# Patient Record
Sex: MALE | Race: WHITE | Employment: OTHER | URBAN - METROPOLITAN AREA
[De-identification: names, ages, dates, MRNs, and addresses within clinical notes are randomized per-mention and may not be internally consistent; named-entity substitution may affect disease eponyms.]

---

## 2018-08-16 ENCOUNTER — OFFICE VISIT (OUTPATIENT)
Dept: SURGERY | Age: 73
End: 2018-08-16
Payer: MEDICARE

## 2018-08-16 VITALS
SYSTOLIC BLOOD PRESSURE: 118 MMHG | HEIGHT: 72 IN | TEMPERATURE: 98.5 F | WEIGHT: 193 LBS | DIASTOLIC BLOOD PRESSURE: 74 MMHG | BODY MASS INDEX: 26.14 KG/M2

## 2018-08-16 DIAGNOSIS — R19.01 ABDOMINAL MASS, RUQ (RIGHT UPPER QUADRANT): Primary | ICD-10-CM

## 2018-08-16 PROCEDURE — 99202 OFFICE O/P NEW SF 15 MIN: CPT | Performed by: SURGERY

## 2018-08-16 RX ORDER — PRAVASTATIN SODIUM 20 MG
10 TABLET ORAL DAILY
COMMUNITY
Start: 2018-06-11

## 2018-08-16 RX ORDER — LISINOPRIL 5 MG/1
TABLET ORAL
Status: ON HOLD | COMMUNITY
Start: 2018-06-11 | End: 2021-09-19

## 2018-08-16 ASSESSMENT — ENCOUNTER SYMPTOMS
RHINORRHEA: 0
ABDOMINAL PAIN: 1
EYES NEGATIVE: 1
ALLERGIC/IMMUNOLOGIC NEGATIVE: 1
CONSTIPATION: 0
CHEST TIGHTNESS: 0
SHORTNESS OF BREATH: 0
BLOOD IN STOOL: 0
COLOR CHANGE: 0
ABDOMINAL DISTENTION: 0

## 2018-08-22 ENCOUNTER — HOSPITAL ENCOUNTER (OUTPATIENT)
Dept: CT IMAGING | Age: 73
Discharge: HOME | End: 2018-08-24
Payer: MEDICARE

## 2018-08-22 VITALS
RESPIRATION RATE: 16 BRPM | HEART RATE: 75 BPM | HEIGHT: 72 IN | BODY MASS INDEX: 26.41 KG/M2 | DIASTOLIC BLOOD PRESSURE: 76 MMHG | SYSTOLIC BLOOD PRESSURE: 145 MMHG | WEIGHT: 195 LBS

## 2018-08-22 DIAGNOSIS — R19.01 ABDOMINAL MASS, RUQ (RIGHT UPPER QUADRANT): ICD-10-CM

## 2018-08-22 PROCEDURE — 74177 CT ABD & PELVIS W/CONTRAST: CPT

## 2018-08-22 PROCEDURE — 2500000003 HC RX 250 WO HCPCS: Performed by: SURGERY

## 2018-08-22 PROCEDURE — 6360000004 HC RX CONTRAST MEDICATION: Performed by: SURGERY

## 2018-08-22 PROCEDURE — 2580000003 HC RX 258: Performed by: SURGERY

## 2018-08-22 RX ORDER — SODIUM CHLORIDE 0.9 % (FLUSH) 0.9 %
10 SYRINGE (ML) INJECTION ONCE
Status: COMPLETED | OUTPATIENT
Start: 2018-08-22 | End: 2018-08-22

## 2018-08-22 RX ADMIN — BARIUM SULFATE 450 ML: 20 SUSPENSION ORAL at 15:16

## 2018-08-22 RX ADMIN — Medication 10 ML: at 15:16

## 2018-08-22 RX ADMIN — IOPAMIDOL 100 ML: 755 INJECTION, SOLUTION INTRAVENOUS at 15:15

## 2018-08-23 ENCOUNTER — TELEPHONE (OUTPATIENT)
Dept: SURGERY | Age: 73
End: 2018-08-23

## 2018-09-06 ENCOUNTER — PREP FOR PROCEDURE (OUTPATIENT)
Dept: SURGERY | Age: 73
End: 2018-09-06

## 2018-09-06 ENCOUNTER — OFFICE VISIT (OUTPATIENT)
Dept: SURGERY | Age: 73
End: 2018-09-06
Payer: MEDICARE

## 2018-09-06 VITALS
WEIGHT: 193 LBS | BODY MASS INDEX: 26.14 KG/M2 | SYSTOLIC BLOOD PRESSURE: 122 MMHG | TEMPERATURE: 97.3 F | DIASTOLIC BLOOD PRESSURE: 76 MMHG | HEIGHT: 72 IN

## 2018-09-06 DIAGNOSIS — K43.6 INCARCERATED VENTRAL HERNIA: Primary | ICD-10-CM

## 2018-09-06 PROCEDURE — 1123F ACP DISCUSS/DSCN MKR DOCD: CPT | Performed by: SURGERY

## 2018-09-06 PROCEDURE — 3017F COLORECTAL CA SCREEN DOC REV: CPT | Performed by: SURGERY

## 2018-09-06 PROCEDURE — G8427 DOCREV CUR MEDS BY ELIG CLIN: HCPCS | Performed by: SURGERY

## 2018-09-06 PROCEDURE — 99213 OFFICE O/P EST LOW 20 MIN: CPT | Performed by: SURGERY

## 2018-09-06 PROCEDURE — G8419 CALC BMI OUT NRM PARAM NOF/U: HCPCS | Performed by: SURGERY

## 2018-09-06 PROCEDURE — 1101F PT FALLS ASSESS-DOCD LE1/YR: CPT | Performed by: SURGERY

## 2018-09-06 PROCEDURE — 1036F TOBACCO NON-USER: CPT | Performed by: SURGERY

## 2018-09-06 PROCEDURE — 4040F PNEUMOC VAC/ADMIN/RCVD: CPT | Performed by: SURGERY

## 2018-09-06 ASSESSMENT — ENCOUNTER SYMPTOMS
BLOOD IN STOOL: 0
SHORTNESS OF BREATH: 0
ABDOMINAL DISTENTION: 0
CHEST TIGHTNESS: 0
CONSTIPATION: 0
RHINORRHEA: 0
ALLERGIC/IMMUNOLOGIC NEGATIVE: 1
ABDOMINAL PAIN: 1
EYES NEGATIVE: 1
COLOR CHANGE: 0

## 2018-09-24 ENCOUNTER — HOSPITAL ENCOUNTER (OUTPATIENT)
Dept: PREADMISSION TESTING | Age: 73
Discharge: HOME | End: 2018-09-28
Payer: MEDICARE

## 2018-09-24 VITALS
SYSTOLIC BLOOD PRESSURE: 154 MMHG | DIASTOLIC BLOOD PRESSURE: 82 MMHG | RESPIRATION RATE: 16 BRPM | BODY MASS INDEX: 27.19 KG/M2 | TEMPERATURE: 97.5 F | HEART RATE: 78 BPM | HEIGHT: 71 IN | WEIGHT: 194.25 LBS | OXYGEN SATURATION: 98 %

## 2018-09-24 PROBLEM — R94.31 ABNORMAL EKG: Status: ACTIVE | Noted: 2018-09-24

## 2018-09-24 PROBLEM — M17.9 OSTEOARTHROSIS OF KNEE: Status: ACTIVE | Noted: 2018-09-24

## 2018-09-24 PROBLEM — E55.9 VITAMIN D DEFICIENCY: Status: ACTIVE | Noted: 2018-09-24

## 2018-09-24 PROBLEM — K76.0 FATTY LIVER DISEASE, NONALCOHOLIC: Status: ACTIVE | Noted: 2018-09-24

## 2018-09-24 PROBLEM — N40.0 BPH WITHOUT URINARY OBSTRUCTION: Status: ACTIVE | Noted: 2018-09-24

## 2018-09-24 PROBLEM — M48.061 SPINAL STENOSIS OF LUMBAR REGION: Status: ACTIVE | Noted: 2018-09-24

## 2018-09-24 PROBLEM — R79.89 ABNORMAL LFTS (LIVER FUNCTION TESTS): Status: ACTIVE | Noted: 2018-09-24

## 2018-09-24 PROBLEM — I10 HYPERTENSION: Status: ACTIVE | Noted: 2018-09-24

## 2018-09-24 LAB
ANION GAP SERPL CALCULATED.3IONS-SCNC: 11 MEQ/L (ref 7–13)
BUN BLDV-MCNC: 12 MG/DL (ref 8–23)
CALCIUM SERPL-MCNC: 9.1 MG/DL (ref 8.6–10.2)
CHLORIDE BLD-SCNC: 102 MEQ/L (ref 98–107)
CO2: 27 MEQ/L (ref 22–29)
CREAT SERPL-MCNC: 0.6 MG/DL (ref 0.7–1.2)
EKG ATRIAL RATE: 67 BPM
EKG P AXIS: 65 DEGREES
EKG P-R INTERVAL: 184 MS
EKG Q-T INTERVAL: 430 MS
EKG QRS DURATION: 152 MS
EKG QTC CALCULATION (BAZETT): 454 MS
EKG R AXIS: -64 DEGREES
EKG T AXIS: 96 DEGREES
EKG VENTRICULAR RATE: 67 BPM
GFR AFRICAN AMERICAN: >60
GFR NON-AFRICAN AMERICAN: >60
GLUCOSE BLD-MCNC: 100 MG/DL (ref 74–109)
HCT VFR BLD CALC: 42.8 % (ref 42–52)
HEMOGLOBIN: 14.7 G/DL (ref 14–18)
MCH RBC QN AUTO: 33.3 PG (ref 27–31.3)
MCHC RBC AUTO-ENTMCNC: 34.3 % (ref 33–37)
MCV RBC AUTO: 97 FL (ref 80–100)
PDW BLD-RTO: 13.1 % (ref 11.5–14.5)
PLATELET # BLD: 210 K/UL (ref 130–400)
POTASSIUM SERPL-SCNC: 4.2 MEQ/L (ref 3.5–5.1)
RBC # BLD: 4.42 M/UL (ref 4.7–6.1)
SODIUM BLD-SCNC: 140 MEQ/L (ref 132–144)
WBC # BLD: 5.8 K/UL (ref 4.8–10.8)

## 2018-09-24 PROCEDURE — 93005 ELECTROCARDIOGRAM TRACING: CPT

## 2018-09-24 PROCEDURE — 80048 BASIC METABOLIC PNL TOTAL CA: CPT

## 2018-09-24 PROCEDURE — 85027 COMPLETE CBC AUTOMATED: CPT

## 2018-09-24 RX ORDER — SODIUM CHLORIDE 0.9 % (FLUSH) 0.9 %
10 SYRINGE (ML) INJECTION PRN
Status: CANCELLED | OUTPATIENT
Start: 2018-09-24

## 2018-09-24 RX ORDER — SODIUM CHLORIDE, SODIUM LACTATE, POTASSIUM CHLORIDE, CALCIUM CHLORIDE 600; 310; 30; 20 MG/100ML; MG/100ML; MG/100ML; MG/100ML
INJECTION, SOLUTION INTRAVENOUS CONTINUOUS
Status: CANCELLED | OUTPATIENT
Start: 2018-09-24

## 2018-09-24 RX ORDER — LIDOCAINE HYDROCHLORIDE 10 MG/ML
1 INJECTION, SOLUTION EPIDURAL; INFILTRATION; INTRACAUDAL; PERINEURAL
Status: CANCELLED | OUTPATIENT
Start: 2018-09-24 | End: 2018-09-24

## 2018-09-24 RX ORDER — M-VIT,TX,IRON,MINS/CALC/FOLIC 27MG-0.4MG
1 TABLET ORAL DAILY
COMMUNITY

## 2018-09-24 RX ORDER — KETOROLAC TROMETHAMINE 30 MG/ML
30 INJECTION, SOLUTION INTRAMUSCULAR; INTRAVENOUS ONCE
Status: CANCELLED | OUTPATIENT
Start: 2018-09-28 | End: 2018-10-02

## 2018-09-24 RX ORDER — SODIUM CHLORIDE 0.9 % (FLUSH) 0.9 %
10 SYRINGE (ML) INJECTION EVERY 12 HOURS SCHEDULED
Status: CANCELLED | OUTPATIENT
Start: 2018-09-24

## 2018-09-25 PROCEDURE — 93010 ELECTROCARDIOGRAM REPORT: CPT | Performed by: INTERNAL MEDICINE

## 2018-09-28 ENCOUNTER — HOSPITAL ENCOUNTER (OUTPATIENT)
Age: 73
Setting detail: OUTPATIENT SURGERY
Discharge: HOME | End: 2018-09-28
Attending: SURGERY | Admitting: SURGERY
Payer: MEDICARE

## 2018-09-28 ENCOUNTER — ANESTHESIA EVENT (OUTPATIENT)
Dept: OPERATING ROOM | Age: 73
End: 2018-09-28
Payer: MEDICARE

## 2018-09-28 ENCOUNTER — ANESTHESIA (OUTPATIENT)
Dept: OPERATING ROOM | Age: 73
End: 2018-09-28
Payer: MEDICARE

## 2018-09-28 VITALS
DIASTOLIC BLOOD PRESSURE: 66 MMHG | RESPIRATION RATE: 16 BRPM | TEMPERATURE: 97.5 F | HEIGHT: 71 IN | SYSTOLIC BLOOD PRESSURE: 139 MMHG | BODY MASS INDEX: 27.16 KG/M2 | HEART RATE: 70 BPM | OXYGEN SATURATION: 96 % | WEIGHT: 194 LBS

## 2018-09-28 VITALS — TEMPERATURE: 95.9 F | DIASTOLIC BLOOD PRESSURE: 70 MMHG | SYSTOLIC BLOOD PRESSURE: 122 MMHG | OXYGEN SATURATION: 100 %

## 2018-09-28 DIAGNOSIS — K43.6 INCARCERATED VENTRAL HERNIA: Primary | ICD-10-CM

## 2018-09-28 PROCEDURE — 2580000003 HC RX 258: Performed by: NURSE PRACTITIONER

## 2018-09-28 PROCEDURE — 2500000003 HC RX 250 WO HCPCS: Performed by: NURSE ANESTHETIST, CERTIFIED REGISTERED

## 2018-09-28 PROCEDURE — 6370000000 HC RX 637 (ALT 250 FOR IP): Performed by: SURGERY

## 2018-09-28 PROCEDURE — 7100000001 HC PACU RECOVERY - ADDTL 15 MIN: Performed by: SURGERY

## 2018-09-28 PROCEDURE — 3600000002 HC SURGERY LEVEL 2 BASE: Performed by: SURGERY

## 2018-09-28 PROCEDURE — 2709999900 HC NON-CHARGEABLE SUPPLY: Performed by: SURGERY

## 2018-09-28 PROCEDURE — A4648 IMPLANTABLE TISSUE MARKER: HCPCS | Performed by: SURGERY

## 2018-09-28 PROCEDURE — 6360000002 HC RX W HCPCS: Performed by: NURSE PRACTITIONER

## 2018-09-28 PROCEDURE — 2500000003 HC RX 250 WO HCPCS

## 2018-09-28 PROCEDURE — 3700000000 HC ANESTHESIA ATTENDED CARE: Performed by: SURGERY

## 2018-09-28 PROCEDURE — 7100000011 HC PHASE II RECOVERY - ADDTL 15 MIN: Performed by: SURGERY

## 2018-09-28 PROCEDURE — 6360000002 HC RX W HCPCS: Performed by: SURGERY

## 2018-09-28 PROCEDURE — 49568 PR IMPLANT MESH HERNIA REPAIR/DEBRIDEMENT CLOSURE: CPT | Performed by: SURGERY

## 2018-09-28 PROCEDURE — C1781 MESH (IMPLANTABLE): HCPCS | Performed by: SURGERY

## 2018-09-28 PROCEDURE — 49560 PR REPAIR INCISIONAL HERNIA,REDUCIBLE: CPT | Performed by: SURGERY

## 2018-09-28 PROCEDURE — 7100000000 HC PACU RECOVERY - FIRST 15 MIN: Performed by: SURGERY

## 2018-09-28 PROCEDURE — 3700000001 HC ADD 15 MINUTES (ANESTHESIA): Performed by: SURGERY

## 2018-09-28 PROCEDURE — 3600000012 HC SURGERY LEVEL 2 ADDTL 15MIN: Performed by: SURGERY

## 2018-09-28 PROCEDURE — 7100000010 HC PHASE II RECOVERY - FIRST 15 MIN: Performed by: SURGERY

## 2018-09-28 PROCEDURE — L0625 LO FLEX L1-BELOW L5 PRE OTS: HCPCS | Performed by: SURGERY

## 2018-09-28 PROCEDURE — 6360000002 HC RX W HCPCS: Performed by: NURSE ANESTHETIST, CERTIFIED REGISTERED

## 2018-09-28 PROCEDURE — 2580000003 HC RX 258: Performed by: SURGERY

## 2018-09-28 DEVICE — PATCH HERN L DIA3.2IN CIR W/ STRP SEPRA TECHNOLOGY ABSRB: Type: IMPLANTABLE DEVICE | Site: ABDOMEN | Status: FUNCTIONAL

## 2018-09-28 RX ORDER — HYDROCODONE BITARTRATE AND ACETAMINOPHEN 5; 325 MG/1; MG/1
1 TABLET ORAL PRN
Status: DISCONTINUED | OUTPATIENT
Start: 2018-09-28 | End: 2018-09-28 | Stop reason: HOSPADM

## 2018-09-28 RX ORDER — SODIUM CHLORIDE, SODIUM LACTATE, POTASSIUM CHLORIDE, CALCIUM CHLORIDE 600; 310; 30; 20 MG/100ML; MG/100ML; MG/100ML; MG/100ML
INJECTION, SOLUTION INTRAVENOUS CONTINUOUS
Status: DISCONTINUED | OUTPATIENT
Start: 2018-09-28 | End: 2018-09-28 | Stop reason: HOSPADM

## 2018-09-28 RX ORDER — PROPOFOL 10 MG/ML
INJECTION, EMULSION INTRAVENOUS PRN
Status: DISCONTINUED | OUTPATIENT
Start: 2018-09-28 | End: 2018-09-28 | Stop reason: SDUPTHER

## 2018-09-28 RX ORDER — ONDANSETRON 2 MG/ML
INJECTION INTRAMUSCULAR; INTRAVENOUS PRN
Status: DISCONTINUED | OUTPATIENT
Start: 2018-09-28 | End: 2018-09-28 | Stop reason: SDUPTHER

## 2018-09-28 RX ORDER — LIDOCAINE HYDROCHLORIDE 10 MG/ML
1 INJECTION, SOLUTION EPIDURAL; INFILTRATION; INTRACAUDAL; PERINEURAL
Status: DISCONTINUED | OUTPATIENT
Start: 2018-09-28 | End: 2018-09-28 | Stop reason: HOSPADM

## 2018-09-28 RX ORDER — MAGNESIUM HYDROXIDE 1200 MG/15ML
LIQUID ORAL CONTINUOUS PRN
Status: DISCONTINUED | OUTPATIENT
Start: 2018-09-28 | End: 2018-09-28 | Stop reason: HOSPADM

## 2018-09-28 RX ORDER — ONDANSETRON 2 MG/ML
4 INJECTION INTRAMUSCULAR; INTRAVENOUS
Status: DISCONTINUED | OUTPATIENT
Start: 2018-09-28 | End: 2018-09-28 | Stop reason: HOSPADM

## 2018-09-28 RX ORDER — SODIUM CHLORIDE 0.9 % (FLUSH) 0.9 %
10 SYRINGE (ML) INJECTION EVERY 12 HOURS SCHEDULED
Status: DISCONTINUED | OUTPATIENT
Start: 2018-09-28 | End: 2018-09-28 | Stop reason: HOSPADM

## 2018-09-28 RX ORDER — HYDROCODONE BITARTRATE AND ACETAMINOPHEN 5; 325 MG/1; MG/1
1 TABLET ORAL EVERY 6 HOURS PRN
Qty: 25 TABLET | Refills: 0 | Status: SHIPPED | OUTPATIENT
Start: 2018-09-28 | End: 2018-10-05

## 2018-09-28 RX ORDER — SODIUM CHLORIDE 0.9 % (FLUSH) 0.9 %
10 SYRINGE (ML) INJECTION PRN
Status: DISCONTINUED | OUTPATIENT
Start: 2018-09-28 | End: 2018-09-28 | Stop reason: HOSPADM

## 2018-09-28 RX ORDER — SODIUM CHLORIDE, SODIUM LACTATE, POTASSIUM CHLORIDE, CALCIUM CHLORIDE 600; 310; 30; 20 MG/100ML; MG/100ML; MG/100ML; MG/100ML
INJECTION, SOLUTION INTRAVENOUS
Status: DISCONTINUED
Start: 2018-09-28 | End: 2018-09-28 | Stop reason: HOSPADM

## 2018-09-28 RX ORDER — LIDOCAINE HYDROCHLORIDE 10 MG/ML
INJECTION, SOLUTION EPIDURAL; INFILTRATION; INTRACAUDAL; PERINEURAL
Status: COMPLETED
Start: 2018-09-28 | End: 2018-09-28

## 2018-09-28 RX ORDER — MORPHINE SULFATE 2 MG/ML
1 INJECTION, SOLUTION INTRAMUSCULAR; INTRAVENOUS
Status: DISCONTINUED | OUTPATIENT
Start: 2018-09-28 | End: 2018-09-28 | Stop reason: HOSPADM

## 2018-09-28 RX ORDER — ESMOLOL HYDROCHLORIDE 10 MG/ML
INJECTION INTRAVENOUS PRN
Status: DISCONTINUED | OUTPATIENT
Start: 2018-09-28 | End: 2018-09-28 | Stop reason: SDUPTHER

## 2018-09-28 RX ORDER — MIDAZOLAM HYDROCHLORIDE 1 MG/ML
INJECTION INTRAMUSCULAR; INTRAVENOUS PRN
Status: DISCONTINUED | OUTPATIENT
Start: 2018-09-28 | End: 2018-09-28 | Stop reason: SDUPTHER

## 2018-09-28 RX ORDER — HYDROCODONE BITARTRATE AND ACETAMINOPHEN 5; 325 MG/1; MG/1
2 TABLET ORAL PRN
Status: DISCONTINUED | OUTPATIENT
Start: 2018-09-28 | End: 2018-09-28 | Stop reason: HOSPADM

## 2018-09-28 RX ORDER — KETOROLAC TROMETHAMINE 15 MG/ML
15 INJECTION, SOLUTION INTRAMUSCULAR; INTRAVENOUS ONCE
Status: COMPLETED | OUTPATIENT
Start: 2018-09-28 | End: 2018-09-28

## 2018-09-28 RX ORDER — DEXAMETHASONE SODIUM PHOSPHATE 10 MG/ML
INJECTION INTRAMUSCULAR; INTRAVENOUS PRN
Status: DISCONTINUED | OUTPATIENT
Start: 2018-09-28 | End: 2018-09-28 | Stop reason: SDUPTHER

## 2018-09-28 RX ORDER — LIDOCAINE HYDROCHLORIDE 20 MG/ML
INJECTION, SOLUTION EPIDURAL; INFILTRATION; INTRACAUDAL; PERINEURAL PRN
Status: DISCONTINUED | OUTPATIENT
Start: 2018-09-28 | End: 2018-09-28 | Stop reason: SDUPTHER

## 2018-09-28 RX ORDER — HYDROCODONE BITARTRATE AND ACETAMINOPHEN 5; 325 MG/1; MG/1
2 TABLET ORAL EVERY 4 HOURS PRN
Status: DISCONTINUED | OUTPATIENT
Start: 2018-09-28 | End: 2018-09-28 | Stop reason: HOSPADM

## 2018-09-28 RX ORDER — ACETAMINOPHEN 325 MG/1
650 TABLET ORAL EVERY 4 HOURS PRN
Status: DISCONTINUED | OUTPATIENT
Start: 2018-09-28 | End: 2018-09-28 | Stop reason: HOSPADM

## 2018-09-28 RX ORDER — ROCURONIUM BROMIDE 10 MG/ML
INJECTION, SOLUTION INTRAVENOUS PRN
Status: DISCONTINUED | OUTPATIENT
Start: 2018-09-28 | End: 2018-09-28 | Stop reason: SDUPTHER

## 2018-09-28 RX ORDER — FENTANYL CITRATE 50 UG/ML
INJECTION, SOLUTION INTRAMUSCULAR; INTRAVENOUS PRN
Status: DISCONTINUED | OUTPATIENT
Start: 2018-09-28 | End: 2018-09-28 | Stop reason: SDUPTHER

## 2018-09-28 RX ORDER — LIDOCAINE HYDROCHLORIDE 10 MG/ML
1 INJECTION, SOLUTION EPIDURAL; INFILTRATION; INTRACAUDAL; PERINEURAL
Status: COMPLETED | OUTPATIENT
Start: 2018-09-28 | End: 2018-09-28

## 2018-09-28 RX ORDER — MEPERIDINE HYDROCHLORIDE 25 MG/ML
12.5 INJECTION INTRAMUSCULAR; INTRAVENOUS; SUBCUTANEOUS EVERY 5 MIN PRN
Status: DISCONTINUED | OUTPATIENT
Start: 2018-09-28 | End: 2018-09-28 | Stop reason: HOSPADM

## 2018-09-28 RX ORDER — HYDROCODONE BITARTRATE AND ACETAMINOPHEN 5; 325 MG/1; MG/1
1 TABLET ORAL EVERY 4 HOURS PRN
Status: DISCONTINUED | OUTPATIENT
Start: 2018-09-28 | End: 2018-09-28 | Stop reason: HOSPADM

## 2018-09-28 RX ORDER — FENTANYL CITRATE 50 UG/ML
50 INJECTION, SOLUTION INTRAMUSCULAR; INTRAVENOUS EVERY 10 MIN PRN
Status: DISCONTINUED | OUTPATIENT
Start: 2018-09-28 | End: 2018-09-28 | Stop reason: HOSPADM

## 2018-09-28 RX ORDER — METOCLOPRAMIDE HYDROCHLORIDE 5 MG/ML
10 INJECTION INTRAMUSCULAR; INTRAVENOUS
Status: DISCONTINUED | OUTPATIENT
Start: 2018-09-28 | End: 2018-09-28 | Stop reason: HOSPADM

## 2018-09-28 RX ORDER — DIPHENHYDRAMINE HYDROCHLORIDE 50 MG/ML
12.5 INJECTION INTRAMUSCULAR; INTRAVENOUS
Status: DISCONTINUED | OUTPATIENT
Start: 2018-09-28 | End: 2018-09-28 | Stop reason: HOSPADM

## 2018-09-28 RX ADMIN — LIDOCAINE HYDROCHLORIDE 0.1 ML: 10 INJECTION, SOLUTION EPIDURAL; INFILTRATION; INTRACAUDAL; PERINEURAL at 06:59

## 2018-09-28 RX ADMIN — SODIUM CHLORIDE, POTASSIUM CHLORIDE, SODIUM LACTATE AND CALCIUM CHLORIDE: 600; 310; 30; 20 INJECTION, SOLUTION INTRAVENOUS at 07:00

## 2018-09-28 RX ADMIN — SODIUM CHLORIDE, POTASSIUM CHLORIDE, SODIUM LACTATE AND CALCIUM CHLORIDE: 600; 310; 30; 20 INJECTION, SOLUTION INTRAVENOUS at 07:55

## 2018-09-28 RX ADMIN — ROCURONIUM BROMIDE 50 MG: 10 INJECTION INTRAVENOUS at 07:32

## 2018-09-28 RX ADMIN — PROPOFOL 50 MG: 10 INJECTION, EMULSION INTRAVENOUS at 07:52

## 2018-09-28 RX ADMIN — LIDOCAINE HYDROCHLORIDE 75 MG: 20 INJECTION, SOLUTION EPIDURAL; INFILTRATION; INTRACAUDAL; PERINEURAL at 07:32

## 2018-09-28 RX ADMIN — PROPOFOL 150 MG: 10 INJECTION, EMULSION INTRAVENOUS at 07:32

## 2018-09-28 RX ADMIN — HYDROCODONE BITARTRATE AND ACETAMINOPHEN 1 TABLET: 5; 325 TABLET ORAL at 10:27

## 2018-09-28 RX ADMIN — Medication 2 G: at 07:28

## 2018-09-28 RX ADMIN — DEXAMETHASONE SODIUM PHOSPHATE 10 MG: 10 INJECTION INTRAMUSCULAR; INTRAVENOUS at 07:38

## 2018-09-28 RX ADMIN — PROPOFOL 50 MG: 10 INJECTION, EMULSION INTRAVENOUS at 08:00

## 2018-09-28 RX ADMIN — MIDAZOLAM HYDROCHLORIDE 2 MG: 1 INJECTION, SOLUTION INTRAMUSCULAR; INTRAVENOUS at 07:26

## 2018-09-28 RX ADMIN — HYDROMORPHONE HYDROCHLORIDE 0.5 MG: 1 INJECTION, SOLUTION INTRAMUSCULAR; INTRAVENOUS; SUBCUTANEOUS at 08:50

## 2018-09-28 RX ADMIN — PHENYLEPHRINE HYDROCHLORIDE 100 MCG: 10 INJECTION INTRAVENOUS at 07:35

## 2018-09-28 RX ADMIN — ONDANSETRON HYDROCHLORIDE 4 MG: 2 INJECTION, SOLUTION INTRAMUSCULAR; INTRAVENOUS at 07:38

## 2018-09-28 RX ADMIN — PROPOFOL 50 MG: 10 INJECTION, EMULSION INTRAVENOUS at 07:45

## 2018-09-28 RX ADMIN — PHENYLEPHRINE HYDROCHLORIDE 100 MCG: 10 INJECTION INTRAVENOUS at 08:14

## 2018-09-28 RX ADMIN — FENTANYL CITRATE 50 MCG: 50 INJECTION, SOLUTION INTRAMUSCULAR; INTRAVENOUS at 08:00

## 2018-09-28 RX ADMIN — SODIUM CHLORIDE, POTASSIUM CHLORIDE, SODIUM LACTATE AND CALCIUM CHLORIDE: 600; 310; 30; 20 INJECTION, SOLUTION INTRAVENOUS at 07:01

## 2018-09-28 RX ADMIN — ROCURONIUM BROMIDE 10 MG: 10 INJECTION INTRAVENOUS at 08:00

## 2018-09-28 RX ADMIN — ESMOLOL HYDROCHLORIDE 20 MG: 100 INJECTION, SOLUTION INTRAVENOUS at 08:01

## 2018-09-28 RX ADMIN — FENTANYL CITRATE 100 MCG: 50 INJECTION, SOLUTION INTRAMUSCULAR; INTRAVENOUS at 07:32

## 2018-09-28 RX ADMIN — PHENYLEPHRINE HYDROCHLORIDE 100 MCG: 10 INJECTION INTRAVENOUS at 08:05

## 2018-09-28 RX ADMIN — FENTANYL CITRATE 100 MCG: 50 INJECTION, SOLUTION INTRAMUSCULAR; INTRAVENOUS at 07:53

## 2018-09-28 RX ADMIN — SUGAMMADEX 352 MG: 100 INJECTION, SOLUTION INTRAVENOUS at 08:20

## 2018-09-28 RX ADMIN — KETOROLAC TROMETHAMINE 15 MG: 15 INJECTION, SOLUTION INTRAMUSCULAR; INTRAVENOUS at 07:00

## 2018-09-28 RX ADMIN — HYDROMORPHONE HYDROCHLORIDE 0.5 MG: 1 INJECTION, SOLUTION INTRAMUSCULAR; INTRAVENOUS; SUBCUTANEOUS at 08:40

## 2018-09-28 ASSESSMENT — PULMONARY FUNCTION TESTS
PIF_VALUE: 2
PIF_VALUE: 2
PIF_VALUE: 16
PIF_VALUE: 17
PIF_VALUE: 0
PIF_VALUE: 1
PIF_VALUE: 16
PIF_VALUE: 16
PIF_VALUE: 14
PIF_VALUE: 17
PIF_VALUE: 14
PIF_VALUE: 13
PIF_VALUE: 17
PIF_VALUE: 17
PIF_VALUE: 15
PIF_VALUE: 15
PIF_VALUE: 1
PIF_VALUE: 14
PIF_VALUE: 2
PIF_VALUE: 1
PIF_VALUE: 7
PIF_VALUE: 20
PIF_VALUE: 14
PIF_VALUE: 16
PIF_VALUE: 15
PIF_VALUE: 12
PIF_VALUE: 15
PIF_VALUE: 1
PIF_VALUE: 4
PIF_VALUE: 16
PIF_VALUE: 1
PIF_VALUE: 1
PIF_VALUE: 16
PIF_VALUE: 1
PIF_VALUE: 17
PIF_VALUE: 15
PIF_VALUE: 15
PIF_VALUE: 0
PIF_VALUE: 13
PIF_VALUE: 9
PIF_VALUE: 15
PIF_VALUE: 16
PIF_VALUE: 15
PIF_VALUE: 3
PIF_VALUE: 26
PIF_VALUE: 13
PIF_VALUE: 16
PIF_VALUE: 0
PIF_VALUE: 17
PIF_VALUE: 2
PIF_VALUE: 13
PIF_VALUE: 12
PIF_VALUE: 15
PIF_VALUE: 15
PIF_VALUE: 1
PIF_VALUE: 1
PIF_VALUE: 16
PIF_VALUE: 16
PIF_VALUE: 1
PIF_VALUE: 12
PIF_VALUE: 16
PIF_VALUE: 3
PIF_VALUE: 15
PIF_VALUE: 21
PIF_VALUE: 16
PIF_VALUE: 15
PIF_VALUE: 17
PIF_VALUE: 0
PIF_VALUE: 40
PIF_VALUE: 2
PIF_VALUE: 17
PIF_VALUE: 15
PIF_VALUE: 2
PIF_VALUE: 15
PIF_VALUE: 12
PIF_VALUE: 4
PIF_VALUE: 9
PIF_VALUE: 2

## 2018-09-28 ASSESSMENT — PAIN SCALES - GENERAL
PAINLEVEL_OUTOF10: 0
PAINLEVEL_OUTOF10: 4
PAINLEVEL_OUTOF10: 0

## 2018-09-28 ASSESSMENT — PAIN - FUNCTIONAL ASSESSMENT: PAIN_FUNCTIONAL_ASSESSMENT: 0-10

## 2018-09-28 NOTE — ANESTHESIA PRE PROCEDURE
Department of Anesthesiology  Preprocedure Note       Name:  Alexis Freeman   Age:  75 y.o.  :  1945                                          MRN:  [id number]         Date:  2018      Surgeon: Esequiel Dobbins):  Aden Aguirre MD    Procedure: Procedure(s):  REPAIR VENTRAL HERNIA, 2 HRS    Medications prior to admission:   Prior to Admission medications    Medication Sig Start Date End Date Taking?  Authorizing Provider   lisinopril (PRINIVIL;ZESTRIL) 5 MG tablet  18  Yes Historical Provider, MD   aspirin 81 MG tablet Take 81 mg by mouth daily    Historical Provider, MD   Multiple Vitamins-Minerals (THERAPEUTIC MULTIVITAMIN-MINERALS) tablet Take 1 tablet by mouth daily    Historical Provider, MD   Cholecalciferol 2000 units CAPS Take 2,000 Units by mouth    Historical Provider, MD   pravastatin (PRAVACHOL) 20 MG tablet Take 20 mg by mouth 18   Historical Provider, MD       Current medications:    Current Facility-Administered Medications   Medication Dose Route Frequency Provider Last Rate Last Dose    ketorolac (TORADOL) injection 15 mg  15 mg Intravenous Once Aden Aguirre MD        lactated ringers infusion   Intravenous Continuous FRANCHESKA Petersen - CNP        lidocaine PF 1 % injection 1 mL  1 mL Intradermal Once PRN FRANCHESKA Petersen - CNP        ceFAZolin (ANCEF) 2 g in dextrose 5 % 100 mL IVPB  2 g Intravenous Once FRANCHESKA Petersen - CNP        lactated ringers infusion             lidocaine PF 1 % injection             fentaNYL (SUBLIMAZE) injection 50 mcg  50 mcg Intravenous Q10 Min PRN Debbie Alfonso MD        HYDROmorphone (DILAUDID) injection 0.5 mg  0.5 mg Intravenous Q10 Min PRN Debbie Alfonso MD        HYDROcodone-acetaminophen [facility]) 5-325 MG per tablet 1 tablet  1 tablet Oral PRN Debbie Alfonso MD        Or    HYDROcodone-acetaminophen [facility]) 5-325 MG per tablet 2 tablet  2 tablet Oral PRN MD Mariposa Jasso diphenhydrAMINE (BENADRYL) injection 12.5 mg  12.5 mg Intravenous Once PRN Vamsi Patton MD        ondansetron [facility]) injection 4 mg  4 mg Intravenous Once PRN Vamsi Patton MD        metoclopramide [facility]) injection 10 mg  10 mg Intravenous Once PRN Vamsi Patton MD        meperidine (DEMEROL) injection 12.5 mg  12.5 mg Intravenous Q5 Min PRN Vamsi Patton MD        lactated ringers infusion   Intravenous Continuous Vamsi Patton MD        sodium chloride flush 0.9 % injection 10 mL  10 mL Intravenous 2 times per day Vamsi Patton MD        sodium chloride flush 0.9 % injection 10 mL  10 mL Intravenous PRN Vamsi Patton MD        lidocaine PF 1 % injection 1 mL  1 mL Intradermal Once PRN Vamsi Patton MD           Allergies:  No Known Allergies    Problem List:    Patient Active Problem List   Diagnosis Code    Abdominal mass, RUQ (right upper quadrant) R19.01    Incarcerated ventral hernia K46.0    Abnormal EKG R94.31    Abnormal LFTs (liver function tests) R94.5    BPH without urinary obstruction N40.0    Fatty liver disease, nonalcoholic Q74.6    Hypertension I10    Osteoarthrosis of knee M17.10    Spinal stenosis of lumbar region M48.061    Vitamin D deficiency E55.9       Past Medical History:        Diagnosis Date    BPH (benign prostatic hyperplasia)     Bundle branch block     Followed by [facility]    Hypertension     Mixed hyperlipidemia     Osteoarthritis        Past Surgical History:        Procedure Laterality Date    BACK SURGERY  08/07/2013    lumbar laminectomy with fusion    BACK SURGERY  1978    CHOLECYSTECTOMY, OPEN  2003    TOTAL KNEE ARTHROPLASTY Left 01/29/2015       Social History:    Social History   Substance Use Topics    Smoking status: Former Smoker     Quit date: 9/24/1970    Smokeless tobacco: Never Used    Alcohol use 3.0 oz/week     5 Glasses of wine per week Counseling given: Not Answered      Vital Signs (Current):   Vitals:    09/28/18 0619   BP: 137/76   Pulse: 75   Resp: 18   Temp: 98.3 °F (36.8 °C)   TempSrc: Temporal   SpO2: 98%   Weight: 194 lb (88 kg)   Height: 5' 11\" (1.803 m)                                              BP Readings from Last 3 Encounters:   09/28/18 137/76   09/24/18 (!) 154/82   09/06/18 122/76       NPO Status: Time of last liquid consumption: 2000                        Time of last solid consumption: 2000                        Date of last liquid consumption: 09/27/18                        Date of last solid food consumption: 09/27/18    BMI:   Wt Readings from Last 3 Encounters:   09/28/18 194 lb (88 kg)   09/24/18 194 lb 4 oz (88.1 kg)   09/06/18 193 lb (87.5 kg)     Body mass index is 27.06 kg/m². CBC:   Lab Results   Component Value Date    WBC 5.8 09/24/2018    RBC 4.42 09/24/2018    HGB 14.7 09/24/2018    HCT 42.8 09/24/2018    MCV 97.0 09/24/2018    RDW 13.1 09/24/2018     09/24/2018       CMP:   Lab Results   Component Value Date     09/24/2018    K 4.2 09/24/2018     09/24/2018    CO2 27 09/24/2018    BUN 12 09/24/2018    CREATININE 0.60 09/24/2018    GFRAA >60.0 09/24/2018    LABGLOM >60.0 09/24/2018    GLUCOSE 100 09/24/2018    CALCIUM 9.1 09/24/2018       POC Tests: No results for input(s): POCGLU, POCNA, POCK, POCCL, POCBUN, POCHEMO, POCHCT in the last 72 hours.     Coags: No results found for: PROTIME, INR, APTT    HCG (If Applicable): No results found for: PREGTESTUR, PREGSERUM, HCG, HCGQUANT     ABGs: No results found for: PHART, PO2ART, PCR9XNI, KSH9BZD, BEART, S0WRZSQG     Type & Screen (If Applicable):  No results found for: LABABO, [address]    Anesthesia Evaluation  Patient summary reviewed and Nursing notes reviewed no history of anesthetic complications:   Airway: Mallampati: II  TM distance: >3 FB   Neck ROM: full  Mouth opening: > = 3 FB Dental: normal exam         Pulmonary:Negative Pulmonary ROS and normal exam                               Cardiovascular:    (+) hypertension:,       ECG reviewed                        Neuro/Psych:   Negative Neuro/Psych ROS              GI/Hepatic/Renal:   (+) liver disease:,           Endo/Other: Negative Endo/Other ROS             Pt had PAT visit. Abdominal:           Vascular: negative vascular ROS. Anesthesia Plan      general     ASA 3       Induction: intravenous. MIPS: Postoperative opioids intended and Prophylactic antiemetics administered. Anesthetic plan and risks discussed with patient. Plan discussed with CRNA.     Attending anesthesiologist reviewed and agrees with Pre Eval content              Murali Overton MD   9/28/2018

## 2018-09-28 NOTE — INTERVAL H&P NOTE
History and Physical exam reviewed, the patient interviewed and re examined. There have been no interval changes.   Electronically signed by Beatriz Britt MD on 9/28/2018 at 7:14 AM

## 2018-09-29 NOTE — OP NOTE
abdominal  cavity which appeared to be colon and omentum. The peritoneum was freed up circumferentially around this area. The defect was about 3 cm in diameter. An 8-cm Ventralex ST composite  circular mesh was put into place and sutured circumferentially with 0  Prolene sutures. Irrigation was done. The sponge, needle, and instrument counts  were correct and the fascial defect was reapproximated with a running 0  Prolene suture. Afterwards, there was excellent hemostasis in the  subcutaneous tissue and 2-0 Vicryl, 3-0 Vicryl, and 4-0 Monocryl with  Dermaflex on the skin edges were used and an Aquacel dressing was placed  over it. The patient tolerated the procedure well, went to recovery in stable  condition, and I spoke with his family at the end of the procedure.         Xiomara Flowers MD    D: 09/28/2018 12:58:57       T: 09/28/2018 12:59:56     [email or web address]  Job#: [id number]     Doc#: [id number]    CC:

## 2018-10-05 ENCOUNTER — OFFICE VISIT (OUTPATIENT)
Dept: SURGERY | Age: 73
End: 2018-10-05

## 2018-10-05 VITALS
BODY MASS INDEX: 26.6 KG/M2 | SYSTOLIC BLOOD PRESSURE: 120 MMHG | HEIGHT: 71 IN | DIASTOLIC BLOOD PRESSURE: 80 MMHG | WEIGHT: 190 LBS | TEMPERATURE: 97 F

## 2018-10-05 DIAGNOSIS — K43.6 INCARCERATED VENTRAL HERNIA: Primary | ICD-10-CM

## 2018-10-05 PROCEDURE — 99024 POSTOP FOLLOW-UP VISIT: CPT | Performed by: SURGERY

## 2018-10-12 ENCOUNTER — OFFICE VISIT (OUTPATIENT)
Dept: SURGERY | Age: 73
End: 2018-10-12

## 2018-10-12 VITALS
DIASTOLIC BLOOD PRESSURE: 70 MMHG | SYSTOLIC BLOOD PRESSURE: 140 MMHG | BODY MASS INDEX: 27.3 KG/M2 | HEIGHT: 71 IN | WEIGHT: 195 LBS | TEMPERATURE: 97.7 F

## 2018-10-12 DIAGNOSIS — K43.6 INCARCERATED VENTRAL HERNIA: ICD-10-CM

## 2018-10-12 DIAGNOSIS — K43.6 INCARCERATED VENTRAL HERNIA: Primary | ICD-10-CM

## 2018-10-12 LAB
HCT VFR BLD CALC: 45.6 % (ref 42–52)
HEMOGLOBIN: 15.2 G/DL (ref 14–18)
MCH RBC QN AUTO: 32.7 PG (ref 27–31.3)
MCHC RBC AUTO-ENTMCNC: 33.2 % (ref 33–37)
MCV RBC AUTO: 98.4 FL (ref 80–100)
PDW BLD-RTO: 13.2 % (ref 11.5–14.5)
PLATELET # BLD: 252 K/UL (ref 130–400)
RBC # BLD: 4.63 M/UL (ref 4.7–6.1)
WBC # BLD: 16.2 K/UL (ref 4.8–10.8)

## 2018-10-12 PROCEDURE — 99024 POSTOP FOLLOW-UP VISIT: CPT | Performed by: SURGERY

## 2018-10-12 RX ORDER — SULFAMETHOXAZOLE AND TRIMETHOPRIM 800; 160 MG/1; MG/1
1 TABLET ORAL 2 TIMES DAILY
Qty: 28 TABLET | Refills: 1 | Status: SHIPPED | OUTPATIENT
Start: 2018-10-12 | End: 2018-10-26

## 2018-10-12 RX ORDER — CEPHALEXIN 250 MG/1
250 CAPSULE ORAL 3 TIMES DAILY
Qty: 42 CAPSULE | Refills: 1 | Status: SHIPPED | OUTPATIENT
Start: 2018-10-12 | End: 2018-10-26

## 2018-10-22 ENCOUNTER — OFFICE VISIT (OUTPATIENT)
Dept: SURGERY | Age: 73
End: 2018-10-22

## 2018-10-22 VITALS
BODY MASS INDEX: 25.06 KG/M2 | SYSTOLIC BLOOD PRESSURE: 138 MMHG | HEIGHT: 72 IN | DIASTOLIC BLOOD PRESSURE: 82 MMHG | WEIGHT: 185 LBS | TEMPERATURE: 96.9 F

## 2018-10-22 DIAGNOSIS — L72.9 SKIN CYST: Primary | ICD-10-CM

## 2018-10-22 DIAGNOSIS — L72.9 SKIN CYST: ICD-10-CM

## 2018-10-22 DIAGNOSIS — K43.6 INCARCERATED VENTRAL HERNIA: ICD-10-CM

## 2018-10-22 PROCEDURE — 99024 POSTOP FOLLOW-UP VISIT: CPT | Performed by: SURGERY

## 2018-10-25 LAB
ANAEROBIC CULTURE: ABNORMAL
GRAM STAIN RESULT: ABNORMAL
ORGANISM: ABNORMAL
WOUND/ABSCESS: ABNORMAL

## 2018-10-26 ENCOUNTER — OFFICE VISIT (OUTPATIENT)
Dept: SURGERY | Age: 73
End: 2018-10-26

## 2018-10-26 VITALS
WEIGHT: 188 LBS | SYSTOLIC BLOOD PRESSURE: 128 MMHG | BODY MASS INDEX: 25.47 KG/M2 | HEIGHT: 72 IN | TEMPERATURE: 97.2 F | DIASTOLIC BLOOD PRESSURE: 86 MMHG

## 2018-10-26 DIAGNOSIS — K43.6 INCARCERATED VENTRAL HERNIA: Primary | ICD-10-CM

## 2018-10-26 PROCEDURE — 99024 POSTOP FOLLOW-UP VISIT: CPT | Performed by: SURGERY

## 2018-10-26 RX ORDER — CIPROFLOXACIN 250 MG/1
250 TABLET, FILM COATED ORAL 2 TIMES DAILY
Qty: 28 TABLET | Refills: 0 | Status: SHIPPED | OUTPATIENT
Start: 2018-10-26 | End: 2018-11-09

## 2018-10-26 NOTE — PROGRESS NOTES
Subjective:      Patient ID: Ean Mace is a 75 y.o. male. NOEMI Mace is status post repair of ventral hernia with mesh on September, 28,2018. The patient is here today in follow up  serosanguineous drainage from his central incision. He has minimal drainage now. He had not had fevers chills or sweats . Has no pain . He is back to normal activitiesl. Pain is 0/10. Appetite is excellent. GI function is normal.   function is normal. Culture showed a rare growth of Enterobacter cloacae complex. Most likely a contaminate. He has not returned to normal activities. Review of Systems    Objective:   Physical Exam   Constitutional: He is oriented to person, place, and time. He appears well-developed and well-nourished. No distress. Abdominal:       Incision is well approximated, erythema is not present, swelling is mild laterally, no evidence of hernia, mild tenderness, no drainage present   Musculoskeletal:   Normal gait   Neurological: He is alert and oriented to person, place, and time. Psychiatric: He has a normal mood and affect. Judgment normal.     /86   Temp 97.2 °F (36.2 °C) (Temporal)   Ht 6' (1.829 m)   Wt 188 lb (85.3 kg)   BMI 25.50 kg/m²   Assessment:      Mild wound drainage, resolved  Does not look infected      Plan:       Cipro  The patient is instructed to return to see me in 10 days.            Jimena Sampson MD

## 2018-11-05 ENCOUNTER — OFFICE VISIT (OUTPATIENT)
Dept: SURGERY | Age: 73
End: 2018-11-05

## 2018-11-05 VITALS
HEIGHT: 72 IN | DIASTOLIC BLOOD PRESSURE: 80 MMHG | BODY MASS INDEX: 26.19 KG/M2 | SYSTOLIC BLOOD PRESSURE: 130 MMHG | TEMPERATURE: 97.1 F | WEIGHT: 193.4 LBS

## 2018-11-05 DIAGNOSIS — K43.6 INCARCERATED VENTRAL HERNIA: Primary | ICD-10-CM

## 2018-11-05 PROCEDURE — 99024 POSTOP FOLLOW-UP VISIT: CPT | Performed by: SURGERY

## 2018-11-05 RX ORDER — CIPROFLOXACIN 250 MG/1
250 TABLET, FILM COATED ORAL 2 TIMES DAILY
Qty: 28 TABLET | Refills: 0 | Status: SHIPPED | OUTPATIENT
Start: 2018-11-05 | End: 2018-11-19

## 2018-11-26 ENCOUNTER — OFFICE VISIT (OUTPATIENT)
Dept: SURGERY | Age: 73
End: 2018-11-26

## 2018-11-26 VITALS
HEIGHT: 72 IN | WEIGHT: 192 LBS | DIASTOLIC BLOOD PRESSURE: 80 MMHG | TEMPERATURE: 97.7 F | BODY MASS INDEX: 26.01 KG/M2 | SYSTOLIC BLOOD PRESSURE: 124 MMHG

## 2018-11-26 DIAGNOSIS — Z48.89 POSTOPERATIVE VISIT: Primary | ICD-10-CM

## 2018-11-26 PROCEDURE — 99024 POSTOP FOLLOW-UP VISIT: CPT | Performed by: SURGERY

## 2018-11-26 RX ORDER — CIPROFLOXACIN 250 MG/1
250 TABLET, FILM COATED ORAL 2 TIMES DAILY
Qty: 28 TABLET | Refills: 0 | Status: SHIPPED | OUTPATIENT
Start: 2018-11-26 | End: 2018-12-10

## 2018-11-26 NOTE — PROGRESS NOTES
Subjective:      Patient ID: Janet Bell is a 75 y.o. male. HPI  Janet Bell is status post repair of ventral hernia with mesh on September, 28,2018. The patient is here today in follow up  serosanguineous drainage from his central incision. He has no drainage. He had not had fevers chills or sweats . Has no pain . He is back to normal activitiesl. Appetite is excellent. GI function is normal.  function is normal. Culture showed a rare growth of Enterobacter cloacae complex. Most likely a contaminate. He has returned to normal activities. He is on no antibioticss. Review of Systems    Objective:   Physical Exam   Constitutional: He is oriented to person, place, and time. He appears well-developed and well-nourished. No distress. Abdominal:       Incision is well approximated, erythema is not present, no swelling, no evidence of hernia, no tenderness, no drainage present   Musculoskeletal:   Normal gait   Neurological: He is alert and oriented to person, place, and time. Psychiatric: He has a normal mood and affect. Judgment normal.     /80   Temp 97.7 °F (36.5 °C) (Temporal)   Ht 6' (1.829 m)   Wt 192 lb (87.1 kg)   BMI 26.04 kg/m²   Assessment:      Doing well      Plan:         The patient is instructed to return to see me 5 months when he returns from [place].            Marylene Bye, MD

## 2021-07-27 NOTE — PROGRESS NOTES
Subjective:      Patient ID: Kesha Mcallister is a 75 y.o. male. HPI  Kesha Mcallister is status post repair of ventral hernia with mesh on September, 28,2018. The patient is here today because he developed serosanguineous drainage from his central incision 3 days ago. He has minimal drainage now. He had not had fevers chills or sweats . Has no pain . He is back to normal activitiesl. Pain is 0/10. Appetite is excellent. GI function is normal.   function is normal.  He has not returned to normal activities. Review of Systems    Objective:   Physical Exam   Constitutional: He is oriented to person, place, and time. He appears well-developed and well-nourished. No distress. Abdominal:       Incision is well approximated, erythema is not present, swelling is mild laterally, no evidence of hernia, mild tenderness, no drainage present   Musculoskeletal:   Normal gait   Neurological: He is alert and oriented to person, place, and time. Psychiatric: He has a normal mood and affect. Judgment normal.     /82   Temp 96.9 °F (36.1 °C) (Temporal)   Ht 6' (1.829 m)   Wt 185 lb (83.9 kg)   BMI 25.09 kg/m²   Assessment:      Mild wound drainage  Does not look infected      Plan:       I cultured a small drop of serous fluid adjacent to the incision. It will probably be contaminated but will help if MRSA is cultured  Continue Bactrim DS and Keflex  The patient is instructed to return to see me in 4 days.            Vilma Toscano MD Cheiloplasty (Complex) Text: A decision was made to reconstruct the defect with a  cheiloplasty.  The defect was undermined extensively.  Additional obicularis oris muscle was excised with a 15 blade scalpel.  The defect was converted into a full thickness wedge to facilite a better cosmetic result.  Small vessels were then tied off with 5-0 monocyrl. The obicularis oris, superficial fascia, adipose and dermis were then reapproximated.  After the deeper layers were approximated the epidermis was reapproximated with particular care given to realign the vermilion border.

## 2021-09-19 ENCOUNTER — APPOINTMENT (OUTPATIENT)
Dept: CT IMAGING | Age: 76
DRG: 354 | End: 2021-09-19
Payer: MEDICARE

## 2021-09-19 ENCOUNTER — ANESTHESIA (OUTPATIENT)
Dept: OPERATING ROOM | Age: 76
DRG: 354 | End: 2021-09-19
Payer: MEDICARE

## 2021-09-19 ENCOUNTER — ANESTHESIA EVENT (OUTPATIENT)
Dept: OPERATING ROOM | Age: 76
DRG: 354 | End: 2021-09-19
Payer: MEDICARE

## 2021-09-19 ENCOUNTER — HOSPITAL ENCOUNTER (INPATIENT)
Age: 76
LOS: 1 days | Discharge: HOME | DRG: 354 | End: 2021-09-20
Attending: COLON & RECTAL SURGERY | Admitting: COLON & RECTAL SURGERY
Payer: MEDICARE

## 2021-09-19 VITALS — SYSTOLIC BLOOD PRESSURE: 147 MMHG | DIASTOLIC BLOOD PRESSURE: 68 MMHG | OXYGEN SATURATION: 99 % | TEMPERATURE: 96.4 F

## 2021-09-19 DIAGNOSIS — K43.9 HERNIA OF ANTERIOR ABDOMINAL WALL: ICD-10-CM

## 2021-09-19 DIAGNOSIS — K56.609 SMALL BOWEL OBSTRUCTION (HCC): Primary | ICD-10-CM

## 2021-09-19 PROBLEM — K43.0 RECURRENT INCISIONAL HERNIA WITH OBSTRUCTION: Status: ACTIVE | Noted: 2021-09-19

## 2021-09-19 LAB
ALBUMIN SERPL-MCNC: 4.1 G/DL (ref 3.5–4.6)
ALP BLD-CCNC: 92 U/L (ref 35–104)
ALT SERPL-CCNC: 19 U/L (ref 0–41)
ANION GAP SERPL CALCULATED.3IONS-SCNC: 11 MEQ/L (ref 9–15)
APTT: 29 SEC (ref 24.4–36.8)
AST SERPL-CCNC: 23 U/L (ref 0–40)
BACTERIA: NEGATIVE /HPF
BASOPHILS ABSOLUTE: 0 K/UL (ref 0–0.2)
BASOPHILS RELATIVE PERCENT: 0.2 %
BILIRUB SERPL-MCNC: 1.3 MG/DL (ref 0.2–0.7)
BILIRUBIN URINE: NEGATIVE
BLOOD, URINE: NEGATIVE
BUN BLDV-MCNC: 13 MG/DL (ref 8–23)
CALCIUM SERPL-MCNC: 9.4 MG/DL (ref 8.5–9.9)
CHLORIDE BLD-SCNC: 101 MEQ/L (ref 95–107)
CLARITY: CLEAR
CO2: 26 MEQ/L (ref 20–31)
COLOR: YELLOW
CREAT SERPL-MCNC: 0.81 MG/DL (ref 0.7–1.2)
EOSINOPHILS ABSOLUTE: 0.1 K/UL (ref 0–0.7)
EOSINOPHILS RELATIVE PERCENT: 0.7 %
EPITHELIAL CELLS, UA: ABNORMAL /HPF (ref 0–5)
GFR AFRICAN AMERICAN: >60
GFR NON-AFRICAN AMERICAN: >60
GLOBULIN: 2.3 G/DL (ref 2.3–3.5)
GLUCOSE BLD-MCNC: 131 MG/DL (ref 70–99)
GLUCOSE URINE: NEGATIVE MG/DL
HCT VFR BLD CALC: 47 % (ref 42–52)
HEMOGLOBIN: 16.3 G/DL (ref 14–18)
HYALINE CASTS: ABNORMAL /HPF (ref 0–5)
INR BLD: 1.2
KETONES, URINE: NEGATIVE MG/DL
LACTIC ACID: 1.6 MMOL/L (ref 0.5–2.2)
LEUKOCYTE ESTERASE, URINE: NEGATIVE
LIPASE: 18 U/L (ref 12–95)
LYMPHOCYTES ABSOLUTE: 0.9 K/UL (ref 1–4.8)
LYMPHOCYTES RELATIVE PERCENT: 10.4 %
MCH RBC QN AUTO: 33.9 PG (ref 27–31.3)
MCHC RBC AUTO-ENTMCNC: 34.7 % (ref 33–37)
MCV RBC AUTO: 97.7 FL (ref 80–100)
MONOCYTES ABSOLUTE: 0.7 K/UL (ref 0.2–0.8)
MONOCYTES RELATIVE PERCENT: 8.5 %
NEUTROPHILS ABSOLUTE: 6.8 K/UL (ref 1.4–6.5)
NEUTROPHILS RELATIVE PERCENT: 80.2 %
NITRITE, URINE: NEGATIVE
PDW BLD-RTO: 13.1 % (ref 11.5–14.5)
PH UA: 7.5 (ref 5–9)
PLATELET # BLD: 219 K/UL (ref 130–400)
POTASSIUM SERPL-SCNC: 4.1 MEQ/L (ref 3.4–4.9)
PROTEIN UA: 30 MG/DL
PROTHROMBIN TIME: 15.4 SEC (ref 12.3–14.9)
RBC # BLD: 4.81 M/UL (ref 4.7–6.1)
RBC UA: ABNORMAL /HPF (ref 0–5)
SARS-COV-2, NAAT: NOT DETECTED
SODIUM BLD-SCNC: 138 MEQ/L (ref 135–144)
SPECIFIC GRAVITY UA: 1.04 (ref 1–1.03)
TOTAL CK: 129 U/L (ref 0–190)
TOTAL PROTEIN: 6.4 G/DL (ref 6.3–8)
TROPONIN: <0.01 NG/ML (ref 0–0.01)
URINE REFLEX TO CULTURE: ABNORMAL
UROBILINOGEN, URINE: 1 E.U./DL
WBC # BLD: 8.5 K/UL (ref 4.8–10.8)
WBC UA: ABNORMAL /HPF (ref 0–5)

## 2021-09-19 PROCEDURE — 2580000003 HC RX 258: Performed by: ANESTHESIOLOGY

## 2021-09-19 PROCEDURE — 6360000002 HC RX W HCPCS: Performed by: ANESTHESIOLOGY

## 2021-09-19 PROCEDURE — 3600000004 HC SURGERY LEVEL 4 BASE: Performed by: COLON & RECTAL SURGERY

## 2021-09-19 PROCEDURE — 49566 PR REPAIR RECURR INCIS HERNIA,STRANG: CPT | Performed by: COLON & RECTAL SURGERY

## 2021-09-19 PROCEDURE — 99285 EMERGENCY DEPT VISIT HI MDM: CPT

## 2021-09-19 PROCEDURE — 83690 ASSAY OF LIPASE: CPT

## 2021-09-19 PROCEDURE — 7100000001 HC PACU RECOVERY - ADDTL 15 MIN: Performed by: COLON & RECTAL SURGERY

## 2021-09-19 PROCEDURE — 64486 TAP BLOCK UNIL BY INJECTION: CPT | Performed by: ANESTHESIOLOGY

## 2021-09-19 PROCEDURE — 2500000003 HC RX 250 WO HCPCS: Performed by: COLON & RECTAL SURGERY

## 2021-09-19 PROCEDURE — 6360000002 HC RX W HCPCS: Performed by: COLON & RECTAL SURGERY

## 2021-09-19 PROCEDURE — 80053 COMPREHEN METABOLIC PANEL: CPT

## 2021-09-19 PROCEDURE — 2580000003 HC RX 258: Performed by: PHYSICIAN ASSISTANT

## 2021-09-19 PROCEDURE — 87635 SARS-COV-2 COVID-19 AMP PRB: CPT

## 2021-09-19 PROCEDURE — 85610 PROTHROMBIN TIME: CPT

## 2021-09-19 PROCEDURE — 83605 ASSAY OF LACTIC ACID: CPT

## 2021-09-19 PROCEDURE — 81001 URINALYSIS AUTO W/SCOPE: CPT

## 2021-09-19 PROCEDURE — C1781 MESH (IMPLANTABLE): HCPCS | Performed by: COLON & RECTAL SURGERY

## 2021-09-19 PROCEDURE — 85730 THROMBOPLASTIN TIME PARTIAL: CPT

## 2021-09-19 PROCEDURE — 96360 HYDRATION IV INFUSION INIT: CPT

## 2021-09-19 PROCEDURE — 82550 ASSAY OF CK (CPK): CPT

## 2021-09-19 PROCEDURE — 3600000014 HC SURGERY LEVEL 4 ADDTL 15MIN: Performed by: COLON & RECTAL SURGERY

## 2021-09-19 PROCEDURE — 3700000000 HC ANESTHESIA ATTENDED CARE: Performed by: COLON & RECTAL SURGERY

## 2021-09-19 PROCEDURE — 36415 COLL VENOUS BLD VENIPUNCTURE: CPT

## 2021-09-19 PROCEDURE — 6370000000 HC RX 637 (ALT 250 FOR IP): Performed by: ANESTHESIOLOGY

## 2021-09-19 PROCEDURE — 84484 ASSAY OF TROPONIN QUANT: CPT

## 2021-09-19 PROCEDURE — 6360000004 HC RX CONTRAST MEDICATION: Performed by: PHYSICIAN ASSISTANT

## 2021-09-19 PROCEDURE — 2580000003 HC RX 258: Performed by: COLON & RECTAL SURGERY

## 2021-09-19 PROCEDURE — 2709999900 HC NON-CHARGEABLE SUPPLY: Performed by: COLON & RECTAL SURGERY

## 2021-09-19 PROCEDURE — 85025 COMPLETE CBC W/AUTO DIFF WBC: CPT

## 2021-09-19 PROCEDURE — 49568 PR IMPLANT MESH HERNIA REPAIR/DEBRIDEMENT CLOSURE: CPT | Performed by: COLON & RECTAL SURGERY

## 2021-09-19 PROCEDURE — 7100000000 HC PACU RECOVERY - FIRST 15 MIN: Performed by: COLON & RECTAL SURGERY

## 2021-09-19 PROCEDURE — 1210000000 HC MED SURG R&B

## 2021-09-19 PROCEDURE — 99221 1ST HOSP IP/OBS SF/LOW 40: CPT | Performed by: COLON & RECTAL SURGERY

## 2021-09-19 PROCEDURE — 0WUF4JZ SUPPLEMENT ABDOMINAL WALL WITH SYNTHETIC SUBSTITUTE, PERCUTANEOUS ENDOSCOPIC APPROACH: ICD-10-PCS | Performed by: COLON & RECTAL SURGERY

## 2021-09-19 PROCEDURE — 93005 ELECTROCARDIOGRAM TRACING: CPT | Performed by: PHYSICIAN ASSISTANT

## 2021-09-19 PROCEDURE — 2500000003 HC RX 250 WO HCPCS: Performed by: ANESTHESIOLOGY

## 2021-09-19 PROCEDURE — 3700000001 HC ADD 15 MINUTES (ANESTHESIA): Performed by: COLON & RECTAL SURGERY

## 2021-09-19 PROCEDURE — 74177 CT ABD & PELVIS W/CONTRAST: CPT

## 2021-09-19 DEVICE — IMPLANTABLE DEVICE: Type: IMPLANTABLE DEVICE | Site: ABDOMEN | Status: FUNCTIONAL

## 2021-09-19 RX ORDER — FENTANYL CITRATE 50 UG/ML
INJECTION, SOLUTION INTRAMUSCULAR; INTRAVENOUS PRN
Status: DISCONTINUED | OUTPATIENT
Start: 2021-09-19 | End: 2021-09-19 | Stop reason: SDUPTHER

## 2021-09-19 RX ORDER — MIDAZOLAM HYDROCHLORIDE 2 MG/2ML
INJECTION, SOLUTION INTRAMUSCULAR; INTRAVENOUS PRN
Status: DISCONTINUED | OUTPATIENT
Start: 2021-09-19 | End: 2021-09-19 | Stop reason: SDUPTHER

## 2021-09-19 RX ORDER — MAGNESIUM HYDROXIDE 1200 MG/15ML
LIQUID ORAL CONTINUOUS PRN
Status: COMPLETED | OUTPATIENT
Start: 2021-09-19 | End: 2021-09-19

## 2021-09-19 RX ORDER — DEXAMETHASONE SODIUM PHOSPHATE 10 MG/ML
INJECTION INTRAMUSCULAR; INTRAVENOUS PRN
Status: DISCONTINUED | OUTPATIENT
Start: 2021-09-19 | End: 2021-09-19 | Stop reason: SDUPTHER

## 2021-09-19 RX ORDER — ONDANSETRON 2 MG/ML
4 INJECTION INTRAMUSCULAR; INTRAVENOUS
Status: ACTIVE | OUTPATIENT
Start: 2021-09-19 | End: 2021-09-19

## 2021-09-19 RX ORDER — PROPOFOL 10 MG/ML
INJECTION, EMULSION INTRAVENOUS PRN
Status: DISCONTINUED | OUTPATIENT
Start: 2021-09-19 | End: 2021-09-19 | Stop reason: SDUPTHER

## 2021-09-19 RX ORDER — 0.9 % SODIUM CHLORIDE 0.9 %
500 INTRAVENOUS SOLUTION INTRAVENOUS
Status: ACTIVE | OUTPATIENT
Start: 2021-09-19 | End: 2021-09-19

## 2021-09-19 RX ORDER — SODIUM CHLORIDE, SODIUM LACTATE, POTASSIUM CHLORIDE, CALCIUM CHLORIDE 600; 310; 30; 20 MG/100ML; MG/100ML; MG/100ML; MG/100ML
INJECTION, SOLUTION INTRAVENOUS CONTINUOUS PRN
Status: DISCONTINUED | OUTPATIENT
Start: 2021-09-19 | End: 2021-09-19 | Stop reason: SDUPTHER

## 2021-09-19 RX ORDER — METOCLOPRAMIDE HYDROCHLORIDE 5 MG/ML
10 INJECTION INTRAMUSCULAR; INTRAVENOUS
Status: ACTIVE | OUTPATIENT
Start: 2021-09-19 | End: 2021-09-19

## 2021-09-19 RX ORDER — DIPHENHYDRAMINE HYDROCHLORIDE 50 MG/ML
12.5 INJECTION INTRAMUSCULAR; INTRAVENOUS
Status: ACTIVE | OUTPATIENT
Start: 2021-09-19 | End: 2021-09-19

## 2021-09-19 RX ORDER — SUCCINYLCHOLINE/SOD CL,ISO/PF 100 MG/5ML
SYRINGE (ML) INTRAVENOUS PRN
Status: DISCONTINUED | OUTPATIENT
Start: 2021-09-19 | End: 2021-09-19 | Stop reason: SDUPTHER

## 2021-09-19 RX ORDER — ROCURONIUM BROMIDE 10 MG/ML
INJECTION, SOLUTION INTRAVENOUS PRN
Status: DISCONTINUED | OUTPATIENT
Start: 2021-09-19 | End: 2021-09-19 | Stop reason: SDUPTHER

## 2021-09-19 RX ORDER — SODIUM CHLORIDE, SODIUM LACTATE, POTASSIUM CHLORIDE, CALCIUM CHLORIDE 600; 310; 30; 20 MG/100ML; MG/100ML; MG/100ML; MG/100ML
INJECTION, SOLUTION INTRAVENOUS
Status: COMPLETED
Start: 2021-09-19 | End: 2021-09-19

## 2021-09-19 RX ORDER — HYDROCODONE BITARTRATE AND ACETAMINOPHEN 5; 325 MG/1; MG/1
2 TABLET ORAL PRN
Status: COMPLETED | OUTPATIENT
Start: 2021-09-19 | End: 2021-09-19

## 2021-09-19 RX ORDER — SODIUM CHLORIDE 0.9 % (FLUSH) 0.9 %
10 SYRINGE (ML) INJECTION ONCE
Status: COMPLETED | OUTPATIENT
Start: 2021-09-19 | End: 2021-09-19

## 2021-09-19 RX ORDER — 0.9 % SODIUM CHLORIDE 0.9 %
1000 INTRAVENOUS SOLUTION INTRAVENOUS ONCE
Status: COMPLETED | OUTPATIENT
Start: 2021-09-19 | End: 2021-09-19

## 2021-09-19 RX ORDER — LABETALOL HYDROCHLORIDE 5 MG/ML
5 INJECTION, SOLUTION INTRAVENOUS EVERY 10 MIN PRN
Status: DISCONTINUED | OUTPATIENT
Start: 2021-09-19 | End: 2021-09-20 | Stop reason: HOSPADM

## 2021-09-19 RX ORDER — HYDROCODONE BITARTRATE AND ACETAMINOPHEN 5; 325 MG/1; MG/1
1 TABLET ORAL PRN
Status: COMPLETED | OUTPATIENT
Start: 2021-09-19 | End: 2021-09-19

## 2021-09-19 RX ORDER — FENTANYL CITRATE 50 UG/ML
25 INJECTION, SOLUTION INTRAMUSCULAR; INTRAVENOUS EVERY 5 MIN PRN
Status: DISCONTINUED | OUTPATIENT
Start: 2021-09-19 | End: 2021-09-20 | Stop reason: HOSPADM

## 2021-09-19 RX ORDER — KETOROLAC TROMETHAMINE 30 MG/ML
30 INJECTION, SOLUTION INTRAMUSCULAR; INTRAVENOUS EVERY 6 HOURS PRN
Status: DISCONTINUED | OUTPATIENT
Start: 2021-09-19 | End: 2021-09-20 | Stop reason: HOSPADM

## 2021-09-19 RX ORDER — BUPIVACAINE HYDROCHLORIDE 5 MG/ML
INJECTION, SOLUTION EPIDURAL; INTRACAUDAL PRN
Status: DISCONTINUED | OUTPATIENT
Start: 2021-09-19 | End: 2021-09-19 | Stop reason: ALTCHOICE

## 2021-09-19 RX ORDER — MAGNESIUM 30 MG
30 TABLET ORAL 2 TIMES DAILY
COMMUNITY

## 2021-09-19 RX ORDER — ROPIVACAINE HYDROCHLORIDE 5 MG/ML
INJECTION, SOLUTION EPIDURAL; INFILTRATION; PERINEURAL
Status: COMPLETED | OUTPATIENT
Start: 2021-09-19 | End: 2021-09-19

## 2021-09-19 RX ADMIN — FENTANYL CITRATE 25 MCG: 50 INJECTION INTRAMUSCULAR; INTRAVENOUS at 12:44

## 2021-09-19 RX ADMIN — KETOROLAC TROMETHAMINE 30 MG: 30 INJECTION, SOLUTION INTRAMUSCULAR; INTRAVENOUS at 12:30

## 2021-09-19 RX ADMIN — HYDROCODONE BITARTRATE AND ACETAMINOPHEN 2 TABLET: 5; 325 TABLET ORAL at 13:06

## 2021-09-19 RX ADMIN — SODIUM CHLORIDE, POTASSIUM CHLORIDE, SODIUM LACTATE AND CALCIUM CHLORIDE: 600; 310; 30; 20 INJECTION, SOLUTION INTRAVENOUS at 10:48

## 2021-09-19 RX ADMIN — Medication 0.2 MG: at 11:49

## 2021-09-19 RX ADMIN — Medication 0.2 MG: at 11:00

## 2021-09-19 RX ADMIN — Medication 0.2 MG: at 11:10

## 2021-09-19 RX ADMIN — SUGAMMADEX 200 MG: 100 INJECTION, SOLUTION INTRAVENOUS at 12:01

## 2021-09-19 RX ADMIN — CEFAZOLIN 2000 MG: 10 INJECTION, POWDER, FOR SOLUTION INTRAVENOUS at 11:01

## 2021-09-19 RX ADMIN — SODIUM CHLORIDE 1000 ML: 9 INJECTION, SOLUTION INTRAVENOUS at 07:05

## 2021-09-19 RX ADMIN — Medication 100 MG: at 10:54

## 2021-09-19 RX ADMIN — ROCURONIUM BROMIDE 10 MG: 10 INJECTION INTRAVENOUS at 11:47

## 2021-09-19 RX ADMIN — MIDAZOLAM HYDROCHLORIDE 2 MG: 1 INJECTION, SOLUTION INTRAMUSCULAR; INTRAVENOUS at 10:43

## 2021-09-19 RX ADMIN — Medication 0.2 MG: at 11:19

## 2021-09-19 RX ADMIN — IOPAMIDOL 100 ML: 755 INJECTION, SOLUTION INTRAVENOUS at 08:32

## 2021-09-19 RX ADMIN — DEXAMETHASONE SODIUM PHOSPHATE 10 MG: 10 INJECTION INTRAMUSCULAR; INTRAVENOUS at 11:10

## 2021-09-19 RX ADMIN — ROCURONIUM BROMIDE 30 MG: 10 INJECTION INTRAVENOUS at 10:59

## 2021-09-19 RX ADMIN — SODIUM CHLORIDE, PRESERVATIVE FREE 10 ML: 5 INJECTION INTRAVENOUS at 08:34

## 2021-09-19 RX ADMIN — PROPOFOL 180 MG: 10 INJECTION, EMULSION INTRAVENOUS at 10:54

## 2021-09-19 RX ADMIN — ROPIVACAINE HYDROCHLORIDE 30 ML: 5 INJECTION, SOLUTION EPIDURAL; INFILTRATION; PERINEURAL at 10:50

## 2021-09-19 RX ADMIN — KETOROLAC TROMETHAMINE 30 MG: 30 INJECTION, SOLUTION INTRAMUSCULAR; INTRAVENOUS at 19:59

## 2021-09-19 RX ADMIN — Medication 0.1 MG: at 11:35

## 2021-09-19 RX ADMIN — FENTANYL CITRATE 50 MCG: 50 INJECTION, SOLUTION INTRAMUSCULAR; INTRAVENOUS at 10:54

## 2021-09-19 RX ADMIN — FENTANYL CITRATE 25 MCG: 50 INJECTION INTRAMUSCULAR; INTRAVENOUS at 12:34

## 2021-09-19 RX ADMIN — ROCURONIUM BROMIDE 20 MG: 10 INJECTION INTRAVENOUS at 11:37

## 2021-09-19 RX ADMIN — SUGAMMADEX 200 MG: 100 INJECTION, SOLUTION INTRAVENOUS at 11:57

## 2021-09-19 RX ADMIN — FENTANYL CITRATE 50 MCG: 50 INJECTION, SOLUTION INTRAMUSCULAR; INTRAVENOUS at 11:03

## 2021-09-19 ASSESSMENT — PULMONARY FUNCTION TESTS
PIF_VALUE: 15
PIF_VALUE: 14
PIF_VALUE: 18
PIF_VALUE: 17
PIF_VALUE: 21
PIF_VALUE: 18
PIF_VALUE: 10
PIF_VALUE: 21
PIF_VALUE: 6
PIF_VALUE: 18
PIF_VALUE: 17
PIF_VALUE: 17
PIF_VALUE: 21
PIF_VALUE: 17
PIF_VALUE: 17
PIF_VALUE: 2
PIF_VALUE: 14
PIF_VALUE: 17
PIF_VALUE: 17
PIF_VALUE: 6
PIF_VALUE: 17
PIF_VALUE: 19
PIF_VALUE: 17
PIF_VALUE: 16
PIF_VALUE: 18
PIF_VALUE: 22
PIF_VALUE: 21
PIF_VALUE: 15
PIF_VALUE: 17
PIF_VALUE: 15
PIF_VALUE: 12
PIF_VALUE: 17
PIF_VALUE: 1
PIF_VALUE: 22
PIF_VALUE: 16
PIF_VALUE: 15
PIF_VALUE: 16
PIF_VALUE: 17
PIF_VALUE: 17
PIF_VALUE: 14
PIF_VALUE: 17
PIF_VALUE: 18
PIF_VALUE: 17
PIF_VALUE: 1
PIF_VALUE: 1
PIF_VALUE: 17
PIF_VALUE: 18
PIF_VALUE: 17
PIF_VALUE: 22
PIF_VALUE: 17
PIF_VALUE: 21
PIF_VALUE: 19
PIF_VALUE: 18
PIF_VALUE: 1
PIF_VALUE: 16
PIF_VALUE: 19
PIF_VALUE: 17
PIF_VALUE: 1
PIF_VALUE: 2
PIF_VALUE: 17
PIF_VALUE: 17
PIF_VALUE: 10
PIF_VALUE: 15
PIF_VALUE: 17
PIF_VALUE: 18
PIF_VALUE: 16
PIF_VALUE: 17

## 2021-09-19 ASSESSMENT — PAIN DESCRIPTION - DESCRIPTORS
DESCRIPTORS: SHARP;ACHING
DESCRIPTORS: ACHING

## 2021-09-19 ASSESSMENT — PAIN DESCRIPTION - ORIENTATION: ORIENTATION: UPPER

## 2021-09-19 ASSESSMENT — PAIN SCALES - GENERAL
PAINLEVEL_OUTOF10: 7
PAINLEVEL_OUTOF10: 5
PAINLEVEL_OUTOF10: 5
PAINLEVEL_OUTOF10: 4
PAINLEVEL_OUTOF10: 6
PAINLEVEL_OUTOF10: 5
PAINLEVEL_OUTOF10: 5

## 2021-09-19 ASSESSMENT — PAIN DESCRIPTION - FREQUENCY: FREQUENCY: INTERMITTENT

## 2021-09-19 ASSESSMENT — PAIN DESCRIPTION - LOCATION
LOCATION: ABDOMEN
LOCATION: ABDOMEN

## 2021-09-19 ASSESSMENT — PAIN DESCRIPTION - PAIN TYPE
TYPE: SURGICAL PAIN
TYPE: ACUTE PAIN

## 2021-09-19 NOTE — ANESTHESIA PRE PROCEDURE
Department of Anesthesiology  Preprocedure Note       Name:  Alaina Lundberg   Age:  67 y.o.  :  1945                                          MRN:  [id number]         Date:  2021      Surgeon: Jose Miguel León):  Amanda Interiano MD    Procedure: Procedure(s):  LAPAROTOMY EXPLORATORY    Medications prior to admission:   Prior to Admission medications    Medication Sig Start Date End Date Taking?  Authorizing Provider   Sacubitril-Valsartan (ENTRESTO PO) Take 47 mg by mouth   Yes Historical Provider, MD   magnesium 30 MG tablet Take 30 mg by mouth 2 times daily   Yes Historical Provider, MD   aspirin 81 MG tablet Take 81 mg by mouth daily   Yes Historical Provider, MD   Multiple Vitamins-Minerals (THERAPEUTIC MULTIVITAMIN-MINERALS) tablet Take 1 tablet by mouth daily   Yes Historical Provider, MD   Cholecalciferol 2000 units CAPS Take 2,000 Units by mouth   Yes Historical Provider, MD   lisinopril (PRINIVIL;ZESTRIL) 5 MG tablet  18  Yes Historical Provider, MD   pravastatin (PRAVACHOL) 20 MG tablet Take 20 mg by mouth every other day  18  Yes Historical Provider, MD       Current medications:    Current Facility-Administered Medications   Medication Dose Route Frequency Provider Last Rate Last Admin    ceFAZolin (ANCEF) 2000 mg in dextrose 5 % 100 mL IVPB  2,000 mg IntraVENous 60 Min Pre-Op Amanda Interiano MD        lactated ringers infusion                Allergies:  No Known Allergies    Problem List:    Patient Active Problem List   Diagnosis Code    Abdominal mass, RUQ (right upper quadrant) R19.01    Incarcerated ventral hernia K43.6    Abnormal EKG R94.31    Abnormal LFTs (liver function tests) R94.5    BPH without urinary obstruction N40.0    Fatty liver disease, nonalcoholic Q49.4    Hypertension I10    Osteoarthrosis of knee M17.10    Spinal stenosis of lumbar region M48.061    Vitamin D deficiency E55.9    Postoperative visit Z48.89       Past Medical History: Diagnosis Date    BPH (benign prostatic hyperplasia)     Bundle branch block     [facility] by [facility]    Hypertension     Mixed hyperlipidemia     Osteoarthritis        Past Surgical History:        Procedure Laterality Date    BACK SURGERY  2013    lumbar laminectomy with fusion   [address], OPEN      INSERTABLE CARDIAC MONITOR  2021    [facility] REPAIR INCISIONAL HERNIA,REDUCIBLE N/A 2018    REPAIR VENTRAL HERNIA  WITH MESH performed by Isaiah Low MD at [address] Left 2015       Social History:    Social History     Tobacco Use    Smoking status: Former Smoker     Quit date: 1970     Years since quittin.0    Smokeless tobacco: Never Used   Substance Use Topics    Alcohol use: Yes     Alcohol/week: 5.0 standard drinks     Types: 5 Glasses of wine per week                                Counseling given: Not Answered      Vital Signs (Current):   Vitals:    21 0815 21 0915 21 1000 21 1015   BP:  135/71 133/78 138/78   Pulse: 77  71    Resp:   16    Temp:   97.1 °F (36.2 °C)    TempSrc:   Temporal    SpO2: 95%  94%    Weight:       Height:                                                  BP Readings from Last 3 Encounters:   21 138/78   18 124/80   18 130/80       NPO Status: Time of last liquid consumption: 0500                        Time of last solid consumption: 1800                        Date of last liquid consumption: 21                        Date of last solid food consumption: 21    BMI:   Wt Readings from Last 3 Encounters:   21 205 lb (93 kg)   18 192 lb (87.1 kg)   18 193 lb 6.4 oz (87.7 kg)     Body mass index is 27.8 kg/m².     CBC:   Lab Results   Component Value Date    WBC 8.5 2021    RBC 4.81 2021    HGB 16.3 2021    HCT 47.0 2021    MCV 97.7 2021    RDW 13.1 2021     2021 CMP:   Lab Results   Component Value Date     09/19/2021    K 4.1 09/19/2021     09/19/2021    CO2 26 09/19/2021    BUN 13 09/19/2021    CREATININE 0.81 09/19/2021    GFRAA >60.0 09/19/2021    LABGLOM >60.0 09/19/2021    GLUCOSE 131 09/19/2021    PROT 6.4 09/19/2021    CALCIUM 9.4 09/19/2021    BILITOT 1.3 09/19/2021    ALKPHOS 92 09/19/2021    AST 23 09/19/2021    ALT 19 09/19/2021       POC Tests: No results for input(s): POCGLU, POCNA, POCK, POCCL, POCBUN, POCHEMO, POCHCT in the last 72 hours. Coags:   Lab Results   Component Value Date    PROTIME 15.4 09/19/2021    INR 1.2 09/19/2021    APTT 29.0 09/19/2021       HCG (If Applicable): No results found for: PREGTESTUR, PREGSERUM, HCG, HCGQUANT     ABGs: No results found for: PHART, PO2ART, QNJ2IFJ, DMQ2GQX, BEART, C9WNGGMU     Type & Screen (If Applicable):  No results found for: LABABO, LABRH    Drug/Infectious Status (If Applicable):  No results found for: HIV, HEPCAB    COVID-19 Screening (If Applicable):   Lab Results   Component Value Date    COVID19 Not Detected 09/19/2021           Anesthesia Evaluation  Patient summary reviewed and Nursing notes reviewed no history of anesthetic complications:   Airway: Mallampati: II  TM distance: >3 FB   Neck ROM: full  Mouth opening: > = 3 FB Dental: normal exam         Pulmonary:Negative Pulmonary ROS and normal exam                               Cardiovascular:Negative CV ROS  Exercise tolerance: good (>4 METS),   (+) hypertension:,       ECG reviewed               Beta Blocker:  Not on Beta Blocker         Neuro/Psych:   Negative Neuro/Psych ROS              GI/Hepatic/Renal: Neg GI/Hepatic/Renal ROS  (+) liver disease:,           Endo/Other: Negative Endo/Other ROS             Pt had PAT visit. Abdominal:             Vascular: negative vascular ROS. Other Findings:             Anesthesia Plan      general and regional     ASA 3 - emergent     (ETT)  Induction: intravenous.     MIPS:

## 2021-09-19 NOTE — ANESTHESIA PROCEDURE NOTES
Peripheral Block    Patient location during procedure: pre-op  Start time: 9/19/2021 10:40 AM  End time: 9/19/2021 10:50 AM  Staffing  Performed: anesthesiologist   Anesthesiologist: Aidee Veliz MD  Preanesthetic Checklist  Completed: patient identified, IV checked, site marked, risks and benefits discussed, surgical consent, monitors and equipment checked, pre-op evaluation, timeout performed, anesthesia consent given, oxygen available and patient being monitored  Peripheral Block  Patient position: supine  Prep: ChloraPrep  Patient monitoring: cardiac monitor, continuous pulse ox, frequent blood pressure checks and IV access  Block type: TAP  Laterality: right  Injection technique: single-shot  Guidance: nerve stimulator and ultrasound guided  Local infiltration: ropivacaine  Infiltration strength: 0.5 %  Dose: 30 mL  Provider prep: mask and sterile gloves (Sterile probe cover)  Local infiltration: ropivacaine  Needle  Needle type: combined needle/nerve stimulator   Needle gauge: 22 G  Needle length: 5 cm  Needle localization: anatomical landmarks and ultrasound guidance  Assessment  Injection assessment: negative aspiration for heme, no paresthesia on injection and local visualized surrounding nerve on ultrasound  Paresthesia pain: immediately resolved  Slow fractionated injection: yes  Hemodynamics: stable  Additional Notes  Ultrasound image printed and saved in patient chart.     Sterile probe cover used    Medications Administered  Ropivacaine (NAROPIN) injection 0.5%, 30 mL  Reason for block: post-op pain management and at surgeon's request

## 2021-09-19 NOTE — ED NOTES
Pt in room resting with family at bedside no complaints at this time      Lucero Batista RN  09/19/21 9149

## 2021-09-19 NOTE — ACP (ADVANCE CARE PLANNING)
regarding differences between Advance Directives and portable DNR orders. Length of ACP Conversation in minutes:      Conversation Outcomes:  [x] ACP discussion completed  [] Existing advance directive reviewed with patient; no changes to patient's previously recorded wishes  [] New Advance Directive completed  [] Portable Do Not Rescitate prepared for Provider review and signature  [] POLST/POST/MOLST/MOST prepared for Provider review and signature    Follow-up plan:    [] Schedule follow-up conversation to continue planning  [] Referred individual to Provider for additional questions/concerns   [] Advised patient/agent/surrogate to review completed ACP document and update if needed with changes in condition, patient preferences or care setting    [x] This note routed to one or more involved healthcare providers - PCP.

## 2021-09-19 NOTE — ANESTHESIA POSTPROCEDURE EVALUATION
Department of Anesthesiology  Postprocedure Note    Patient: Samaria Mustafa  MRN: [id number]  YOB: 1945  Date of evaluation: 9/19/2021  Time:  12:14 PM     Procedure Summary     Date: 09/19/21 Room / Location: [address]    Anesthesia Start: 1048 Anesthesia Stop: 1214    Procedure: LAPAROTOMY EXPLORATORY W/ REPAIR OF INCARCERATED HERNIA WITH MESH, EXPLORATORY LAPAROSCOPY WITH LYSIS OF ADHESIONS (N/A ) Diagnosis: (INCARCERATED ABDOMINAL HERNIA)    Surgeons: Tiffani Urbano MD Responsible Provider: Janae Headley MD    Anesthesia Type: general, regional ASA Status: 3 - Emergent          Anesthesia Type: general, regional    Kanika Phase I: Kanika Score: 9    Kanika Phase II:      Last vitals: Reviewed and per EMR flowsheets.        Anesthesia Post Evaluation    Patient location during evaluation: bedside  Patient participation: complete - patient participated  Level of consciousness: awake and awake and alert  Airway patency: patent  Nausea & Vomiting: no nausea and no vomiting  Complications: no  Cardiovascular status: blood pressure returned to baseline and hemodynamically stable  Respiratory status: acceptable  Hydration status: euvolemic

## 2021-09-19 NOTE — OP NOTE
small intestine was reduced. The mesh was removed. A piece of Ventrio mesh 12 x 8 cm was placed subfascially and anchored  in six locations using interrupted 0 Vicryl suture. Five additional 0  Prolene sutures were used to anchor the apron to the surrounding fascia. Upon completion, Ancef containing solution was used for irrigation. The  deep layer was closed using interrupted 0 Vicryl suture. The skin was  closed with staples. A left upper quadrant 5 mm incision was made. An optical trocar was  placed and pneumoperitoneum was established. An additional 5-mm port  was placed. The mesh repair was viewed internally and was found to be complete. The  small bowel was freed up from any obstructive process. The transverse  colon which was adhesed to the anterior abdominal wall superiorly was  away from the mesh repair. The remainder of the laparoscopy was  otherwise unremarkable. Pneumoperitoneum was released and the ports were removed. The skin  incisions were closed with staples and dressings were applied. Abdominal binder was placed. Prior to and after closure, lap, needle, instrument counts were all  correct. The patient was extubated and taken to the recovery room for postop  monitoring.         Yesenia Camacho MD    D: 09/19/2021 12:21:35       T: 09/19/2021 12:23:53     [email or web address]  Job#: [id number]     Doc#: [id number]    CC:

## 2021-09-19 NOTE — H&P
Disease Brother     No Known Problems Daughter     No Known Problems Son        REVIEW OF SYSTEMS:    CONSTITUTIONAL:  negative  EYES:  negative  HEENT:  negative  RESPIRATORY:  negative  CARDIOVASCULAR:  negative  GASTROINTESTINAL:  positive for nausea, change in bowel habits and abdominal pain  GENITOURINARY:  negative  MUSCULOSKELETAL:  negative  NEUROLOGICAL:  negative  BEHAVIOR/PSYCH:  negative    PHYSICAL EXAM:    VITALS:  /78   Pulse 71   Temp 97.1 °F (36.2 °C) (Temporal)   Resp 16   Ht 6' (1.829 m)   Wt 205 lb (93 kg)   SpO2 94%   BMI 27.80 kg/m²   CONSTITUTIONAL:  awake, alert, cooperative, no apparent distress, and appears stated age  EYES:  Lids and lashes normal, pupils equal, round and reactive to light, extra ocular muscles intact, sclera clear, conjunctiva normal  ENT:  Normocephalic, without obvious abnormality, atraumatic, sinuses nontender on palpation, external ears without lesions, oral pharynx with moist mucus membranes, tonsils without erythema or exudates, gums normal and good dentition. NECK:  Supple, symmetrical, trachea midline, no adenopathy, thyroid symmetric, not enlarged and no tenderness, skin normal  HEMATOLOGIC/LYMPHATICS:  no cervical lymphadenopathy  BACK:  Symmetric, no curvature, spinous processes are non-tender on palpation, paraspinous muscles are non-tender on palpation, no costal vertebral tenderness  LUNGS:  No increased work of breathing, good air exchange, clear to auscultation bilaterally, no crackles or wheezing  CARDIOVASCULAR:  Normal apical impulse, regular rate and rhythm, normal S1 and S2, no S3 or S4, and no murmur noted  ABDOMEN: Abdomen soft, incarcerated incisional hernia present at the lower portion of his subcostal incision on the right. No skin changes present. MUSCULOSKELETAL:  There is no redness, warmth, or swelling of the joints. Full range of motion noted. Motor strength is 5 out of 5 all extremities bilaterally.   Tone is normal.  NEUROLOGIC: Awake alert oriented  SKIN:  no bruising or bleeding  DATA:    CBC:   Lab Results   Component Value Date    WBC 8.5 09/19/2021    RBC 4.81 09/19/2021    HGB 16.3 09/19/2021    HCT 47.0 09/19/2021    MCV 97.7 09/19/2021    MCH 33.9 09/19/2021    MCHC 34.7 09/19/2021    RDW 13.1 09/19/2021     09/19/2021    MPV 9.5 08/01/2013     CMP:    Lab Results   Component Value Date     09/19/2021    K 4.1 09/19/2021     09/19/2021    CO2 26 09/19/2021    BUN 13 09/19/2021    CREATININE 0.81 09/19/2021    GFRAA >60.0 09/19/2021    LABGLOM >60.0 09/19/2021    GLUCOSE 131 09/19/2021    PROT 6.4 09/19/2021    LABALBU 4.1 09/19/2021    CALCIUM 9.4 09/19/2021    BILITOT 1.3 09/19/2021    ALKPHOS 92 09/19/2021    AST 23 09/19/2021    ALT 19 09/19/2021     Radiology Review: CT of the abdomen pelvis as outlined above    IMPRESSION/RECOMMENDATIONS:      Incarcerated incisional hernia with small bowel obstruction    I discussed the risks and benefits of immediate repair possibly with an additional piece of mesh for treatment of this obstruction. Risks of the surgery explained with the wife present at the bedside. Risks of infection, bleeding, recurrence, damage to the underlying intestine and postoperative pain all addressed. Despite these risks, he wishes to proceed. Consent obtained. Covid negative    Nonoperative alternatives were given but not recommended secondary to his exam and imaging.

## 2021-09-19 NOTE — ED PROVIDER NOTES
[facility] [address]  eMERGENCY dEPARTMENT eNCOUnter      Pt Name: Jaycee Storey  MRN: [id number]  Seemagfjohn 1945  Date of evaluation: 9/19/2021  Provider: Ashok Bolanos PA-C    CHIEF COMPLAINT       Chief Complaint   Patient presents with    Abdominal Pain         HISTORY OF PRESENT ILLNESS   (Location/Symptom, Timing/Onset,Context/Setting, Quality, Duration, Modifying Factors, Severity)  Note limiting factors. Jaycee Storey is a 67 y.o. male who presents to the emergency department with complaint of crampy midepigastric abdominal pain which patient states been ongoing for last 4 days, he states progressively got worse this morning, and then did wake him up from sleep approximately 1 hour ago. He has no nausea, no fevers, no urinary complaints, no constipation or diarrhea. Patient denies any past history of abdominal problems he is rating his current pain at this time is a 4 out of 10 to the midepigastric region and under the diaphragm bilaterally. There is no other radiation of pain. Patient states decreased appetite over the last 4 days, past medical history significant for hypertension, hyperlipidemia. HPI    NursingNotes were reviewed. REVIEW OF SYSTEMS    (2-9 systems for level 4, 10 or more for level 5)     Review of Systems   Constitutional: Negative for activity change and appetite change. HENT: Negative for congestion, ear discharge, ear pain, nosebleeds, rhinorrhea and sore throat. Eyes: Negative for discharge. Respiratory: Negative for shortness of breath. Cardiovascular: Negative for chest pain, palpitations and leg swelling. Gastrointestinal: Positive for abdominal pain. Negative for abdominal distention, constipation, diarrhea, nausea and vomiting. Genitourinary: Negative for difficulty urinating, dysuria and flank pain. Musculoskeletal: Negative for arthralgias. Skin: Negative for color change.    Neurological: Negative for dizziness, tremors, syncope, weakness, numbness and headaches. Psychiatric/Behavioral: Negative for agitation and confusion. Except as noted above the remainder of the review of systems was reviewed and negative. PAST MEDICAL HISTORY     Past Medical History:   Diagnosis Date    BPH (benign prostatic hyperplasia)     Bundle branch block     Followed by [facility]    Hypertension     Mixed hyperlipidemia     Osteoarthritis          SURGICALHISTORY       Past Surgical History:   Procedure Laterality Date    BACK SURGERY  08/07/2013    lumbar laminectomy with fusion   [address], OPEN  2003    INSERTABLE CARDIAC MONITOR  05/2021    LAPAROTOMY N/A 9/19/2021    LAPAROTOMY EXPLORATORY W/ REPAIR OF INCARCERATED HERNIA WITH MESH, EXPLORATORY LAPAROSCOPY WITH LYSIS OF ADHESIONS performed by Heriberto Brown MD at [address] N/A 9/28/2018    REPAIR VENTRAL HERNIA  WITH MESH performed [place] at [address] [address] 01/29/2015         CURRENT MEDICATIONS       Discharge Medication List as of 9/20/2021 11:19 AM      CONTINUE these medications which have NOT CHANGED    Details   Sacubitril-Valsartan (ENTRESTO PO) Take by mouth 2 times daily 97/103mg pt takes 1/2 a pill twice a dayHistorical Med      magnesium 30 MG tablet Take 30 mg by mouth 2 times dailyHistorical Med      aspirin 81 MG tablet Take 81 mg by mouth dailyHistorical Med      Multiple Vitamins-Minerals (THERAPEUTIC MULTIVITAMIN-MINERALS) tablet Take 1 tablet by mouth dailyHistorical Med      pravastatin (PRAVACHOL) 20 MG tablet Take 10 mg by mouth daily Historical Med             ALLERGIES     Patient has no known allergies.     FAMILY HISTORY       Family History   Problem Relation Age of Onset    Stroke Father     Heart Disease Brother     No Known Problems Daughter     No Known Problems Son           SOCIAL HISTORY       Social History     Socioeconomic History    Marital status:      Spouse name: None    Number of children: None    Years of education: None    Highest education level: None   Occupational History    None   Tobacco Use    Smoking status: Former Smoker     Quit date: 1970     Years since quittin.0    Smokeless tobacco: Never Used   Substance and Sexual Activity    Alcohol use: Yes     Alcohol/week: 5.0 standard drinks     Types: 5 Glasses of wine per week    Drug use: No    Sexual activity: None   Other Topics Concern    None   Social History Narrative    None     Social Determinants of Health     Financial Resource Strain:     Difficulty of Paying Living Expenses:    Food Insecurity:     Worried About Running Out of Food in the Last Year:     Ran Out of Food in the Last Year:    Transportation Needs:     Lack of Transportation (Medical):  Lack of Transportation (Non-Medical):    Physical Activity:     Days of Exercise per Week:     Minutes of Exercise per Session:    Stress:     Feeling of Stress :    Social Connections:     Frequency of Communication with Friends and Family:     Frequency of Social Gatherings with Friends and Family:     Attends Shinto Services:     Active Member of Clubs or Organizations:     Attends Club or Organization Meetings:     Marital Status:    Intimate Partner Violence:     Fear of Current or Ex-Partner:     Emotionally Abused:     Physically Abused:     Sexually Abused:        SCREENINGS    [place] Coma Scale  Eye Opening: Spontaneous  Best Verbal Response: Oriented  Best Motor Response: Obeys commands  [place] Coma Scale Score: 15 @FLOW([id number])@      PHYSICAL EXAM    (up to 7 for level 4, 8 or more for level 5)     ED Triage Vitals [21 0627]   BP Temp Temp Source Pulse Resp SpO2 Height Weight   (!) 141/86 98 °F (36.7 °C) Oral 89 20 96 % 6' (1.829 m) 205 lb (93 kg)       Physical Exam  Vitals and nursing note reviewed.    Constitutional:       General: He is not in acute distress. Appearance: He is well-developed. He is not ill-appearing, toxic-appearing or diaphoretic. HENT:      Head: Normocephalic. Nose: No congestion. Mouth/Throat:      Mouth: Mucous membranes are moist.      Pharynx: No oropharyngeal exudate or posterior oropharyngeal erythema. Eyes:      Extraocular Movements: Extraocular movements intact. Conjunctiva/sclera: Conjunctivae normal.      Pupils: Pupils are equal, round, and reactive to light. Neck:      Vascular: No JVD. Trachea: No tracheal deviation. Cardiovascular:      Rate and Rhythm: Normal rate. Pulses: Normal pulses. Heart sounds: Normal heart sounds. No murmur heard. No friction rub. No gallop. Pulmonary:      Effort: Pulmonary effort is normal. No tachypnea, accessory muscle usage, respiratory distress or retractions. Breath sounds: No stridor. No wheezing, rhonchi or rales. Chest:      Chest wall: No tenderness. Abdominal:      General: Abdomen is flat. Bowel sounds are normal. There is no distension or abdominal bruit. Palpations: There is no shifting dullness, fluid wave, hepatomegaly, splenomegaly, mass or pulsatile mass. Tenderness: There is no abdominal tenderness. There is no right CVA tenderness, left CVA tenderness, guarding or rebound. Negative signs include Doan's sign, Rovsing's sign and McBurney's sign. Comments: Abdomen soft, mild distention noted in the right upper and left upper quadrant no guarding mass or rebound, no CVA tenderness. Musculoskeletal:         General: No deformity. Cervical back: Normal range of motion and neck supple. No rigidity. Skin:     General: Skin is warm and dry. Capillary Refill: Capillary refill takes less than 2 seconds. Coloration: Skin is not jaundiced. Neurological:      General: No focal deficit present. Mental Status: He is alert and oriented to person, place, and time. Mental status is at baseline. Cranial Nerves: No cranial nerve deficit. Sensory: No sensory deficit. Motor: No weakness. Coordination: Coordination normal.   Psychiatric:         Mood and Affect: Mood normal.         DIAGNOSTIC RESULTS     EKG: All EKG's are interpreted by the Emergency Department Physician who either signs or Co-signsthis chart in the absence of a cardiologist.    EKG shows sinus bradycardia 52 bpm there is left bundle branch block. There is T wave inversions in leads I aVL no ventricular ectopy QTC is 407 ms,    RADIOLOGY:   Non-plain filmimages such as CT, Ultrasound and MRI are read by the radiologist. Plain radiographic images are visualized and preliminarily interpreted by the emergency physician with the below findings:        Interpretation per the Radiologist below, if available at the time ofthis note:    CT ABDOMEN PELVIS W IV CONTRAST Additional Contrast? None   Final Result      Findings compatible with partial small bowel obstruction secondary to an anterolateral right lower abdominal wall hernia containing a short segment of small bowel. All CT scans at this facility use dose modulation, iterative reconstruction, and/or weight based dosing when appropriate to reduce radiation dose to as low as reasonably achievable.             ED BEDSIDE ULTRASOUND:   Performed by ED Physician - none    LABS:  Labs Reviewed   COMPREHENSIVE METABOLIC PANEL - Abnormal; Notable for the following components:       Result Value    Glucose 131 (*)     Total Bilirubin 1.3 (*)     All other components within normal limits   CBC WITH AUTO DIFFERENTIAL - Abnormal; Notable for the following components:    MCH 33.9 (*)     Neutrophils Absolute 6.8 (*)     Lymphocytes Absolute 0.9 (*)     All other components within normal limits   URINE RT REFLEX TO CULTURE - Abnormal; Notable for the following components:    Protein, UA 30 (*)     All other components within normal limits   PROTIME-INR - Abnormal; Notable for the following components:    Protime 15.4 (*)     All other components within normal limits   MICROSCOPIC URINALYSIS - Abnormal; Notable for the following components:    RBC, UA 3-5 (*)     All other components within normal limits   BASIC METABOLIC PANEL - Abnormal; Notable for the following components:    Glucose 105 (*)     CREATININE 0.65 (*)     All other components within normal limits   CBC - Abnormal; Notable for the following components:    WBC 11.9 (*)     RBC 4.23 (*)     Hematocrit 41.8 (*)     MCH 33.9 (*)     All other components within normal limits   COVID-19, RAPID   LACTIC ACID, PLASMA   LIPASE   TROPONIN   CK   APTT       All other labs were within normal range or not returned as of this dictation. EMERGENCY DEPARTMENT COURSE and DIFFERENTIAL DIAGNOSIS/MDM:   Vitals:    Vitals:    09/19/21 1305 09/19/21 1330 09/19/21 1959 09/20/21 0830   BP: 135/70 125/70 (!) 111/58    Pulse: 77 77 74 75   Resp: 19 18 18    Temp:  97.1 °F (36.2 °C) 97.9 °F (36.6 °C)    TempSrc:  Oral Oral    SpO2: 97% 93% 92%    Weight:       Height:              MDM  Number of Diagnoses or Management Options  Hernia of anterior abdominal wall  Small bowel obstruction (HCC)  Diagnosis management comments: Patient presented ED with a complaint of abdominal pain, nausea vomiting, which she states been ongoing for last 4 days, he states progressively got worse today, and states that the pain woke him up from sleep. CT scan the abdomen pelvis was completed which shows findings compatible with partial small bowel obstruction secondary to anterior lateral right lower lobe abdominal wall hernia containing short segment of small bowel. Lab work was obtained in this patient as well which shows no specific or acute abnormality at this time. I did speak with Dr. Sherice Rees of general surgery, he will make arrangements to take patient in for surgical intervention at this time.   Last meal per patient approximately 6 PM yesterday he only ate to spoons of soup at that time. CRITICAL CARE TIME   Total Critical Care time was 0 minutes, excluding separately reportableprocedures. There was a high probability of clinicallysignificant/life threatening deterioration in the patient's condition which required my urgent intervention. CONSULTS:  Kitty Fishman CONSULT TO HOSPITALIST    PROCEDURES:  Unless otherwise noted below, none     Procedures    FINAL IMPRESSION      1. Small bowel obstruction ([address]    2. Hernia of anterior abdominal wall          DISPOSITION/PLAN   DISPOSITION        PATIENT REFERRED TO:  Ulisses Morris MD  [facility]  [address]  [address] [facility]  [phone]    On 9/28/2021  FOR WOUND RECHECK AND SUTURE REMOVAL, 930AM    Joaquin Wynn MD  [place] [address]  [phone]    On 9/22/2021  AT 840AM, MEDICATIONS, PHOTO I.D., INSURANCE CARDS, DISCHARGE SUMMARY      DISCHARGE MEDICATIONS:  Discharge Medication List as of 9/20/2021 11:19 AM      START taking these medications    Details   oxyCODONE-acetaminophen (PERCOCET) 5-325 MG per tablet Take 1 tablet by mouth every 6 hours as needed for Pain for up to 3 days. , Disp-12 tablet, R-0Print                (Please note that portions of this note were completed with a voice recognition program.  Efforts were made to edit the dictations but occasionally words are mis-transcribed.)    Jos Mulligan PA-C (electronically signed)  Attending Emergency Physician         Jos Mulligan PA-C  09/19/21 Iqra Sanderson PA-C  09/19/21 [facility]  09/23/21 0340

## 2021-09-19 NOTE — ED NOTES
Pharmacy sent Ancef bag was broken and spilled all over desk and tube pharmacy notified and will call PACU for further guidance      Yesica Bennett RN  09/19/21 3581

## 2021-09-19 NOTE — CARE COORDINATION
[facility] EMERGENCY [facility] AT [place] Case Management   Initial Discharge Assessment    Met with patient and his spouse at bedside in ER to discuss discharge plan. Patient is being taken to surgery directly from ER - CM to reassess post-op dependent on outcomes/needs. PCP: Hector Mahmood MD                                  Date of Last Visit: 9/14/21  If no PCP, list provided? N/A    Discharge Planning    Living Arrangements: Patient lives independently at home in two-story residence, has full bath available on the first floor if needed. Who do you live with? Spouse Analia Del Valle ([phone]). Who helps you with your care: Patient is fully independent with ADLs and IADLs at baseline. Spouse states she is able to assist if needed. If lives at home: Do you have any barriers navigating in your home? No    Patient can perform ADL? Yes    Current Services (outpatient and in home): None    Dialysis: No    Is transportation available to get to your appointments? Yes - Patient drives or spouse can transport. DME Equipment: None    Respiratory equipment: None    Respiratory provider: RICARDO     Pharmacy: [facility] [address] in [address] [place] [address] with Medication Assistance Program?  No      [facility] - No needs anticipated, CM did explain freedom of choice if HHC is ordered. Patient agreeable to SNF/Rehab? N/A - No SNF needs anticipated. Other discharge needs identified? Other - TBD post-op (patient going to surgery from ER for partial SBO). Does Patient Have a High-Risk for Readmission Diagnosis (CHF, PN, MI, COPD)? No     History: hypertension, BPH, osteoarthritis, hyperlipidemia. Initial Discharge Plan? (Note: please see concurrent daily documentation for any updates after initial note). Home with spouse, need TBD post-op. Risk of admission or ER visit as calculated by [facility] while patient in ER: 7%.     Electronically signed by Pete Pedroza RN on 9/19/2021 at 9:52 AM

## 2021-09-19 NOTE — BRIEF OP NOTE
Department of General Surgery - Adult  Surgical Service general surgery  Brief Operative Report      Pre-operative Diagnosis: Incarcerated incisional hernia with bowel obstruction    Post-operative Diagnosis: Same    Procedure: Recurrent incisional hernia repair, diagnostic laparoscopy    Surgeon: Marie Abraham     Assistant(s): Peggy    Anesthesia:  General endotrachial anesthesia, Local anesthesia and right tap block    Estimated blood loss:  Minimal    Total IV fluids: 30 ml    Total Urine Output: Not recorded     Drains: None    Specimens: None     Implants:  12 x 8 cm Ventrio mesh    Findings: Recurrent incisional hernia with bowel obstruction    Complications: None    Condition:  stable    See dictated operative report for full details.

## 2021-09-20 VITALS
WEIGHT: 205 LBS | RESPIRATION RATE: 18 BRPM | BODY MASS INDEX: 27.77 KG/M2 | HEIGHT: 72 IN | TEMPERATURE: 97.9 F | HEART RATE: 75 BPM | SYSTOLIC BLOOD PRESSURE: 111 MMHG | OXYGEN SATURATION: 92 % | DIASTOLIC BLOOD PRESSURE: 58 MMHG

## 2021-09-20 PROBLEM — E78.5 HLD (HYPERLIPIDEMIA): Status: ACTIVE | Noted: 2021-09-20

## 2021-09-20 LAB
ANION GAP SERPL CALCULATED.3IONS-SCNC: 11 MEQ/L (ref 9–15)
BUN BLDV-MCNC: 14 MG/DL (ref 8–23)
CALCIUM SERPL-MCNC: 8.7 MG/DL (ref 8.5–9.9)
CHLORIDE BLD-SCNC: 101 MEQ/L (ref 95–107)
CO2: 26 MEQ/L (ref 20–31)
CREAT SERPL-MCNC: 0.65 MG/DL (ref 0.7–1.2)
EKG ATRIAL RATE: 52 BPM
EKG P AXIS: 53 DEGREES
EKG P-R INTERVAL: 184 MS
EKG Q-T INTERVAL: 438 MS
EKG QRS DURATION: 152 MS
EKG QTC CALCULATION (BAZETT): 407 MS
EKG R AXIS: -61 DEGREES
EKG T AXIS: 105 DEGREES
EKG VENTRICULAR RATE: 52 BPM
GFR AFRICAN AMERICAN: >60
GFR NON-AFRICAN AMERICAN: >60
GLUCOSE BLD-MCNC: 105 MG/DL (ref 70–99)
HCT VFR BLD CALC: 41.8 % (ref 42–52)
HEMOGLOBIN: 14.4 G/DL (ref 14–18)
MCH RBC QN AUTO: 33.9 PG (ref 27–31.3)
MCHC RBC AUTO-ENTMCNC: 34.3 % (ref 33–37)
MCV RBC AUTO: 98.9 FL (ref 80–100)
PDW BLD-RTO: 13.2 % (ref 11.5–14.5)
PLATELET # BLD: 187 K/UL (ref 130–400)
POTASSIUM SERPL-SCNC: 4.2 MEQ/L (ref 3.4–4.9)
RBC # BLD: 4.23 M/UL (ref 4.7–6.1)
SODIUM BLD-SCNC: 138 MEQ/L (ref 135–144)
WBC # BLD: 11.9 K/UL (ref 4.8–10.8)

## 2021-09-20 PROCEDURE — 93010 ELECTROCARDIOGRAM REPORT: CPT | Performed by: INTERNAL MEDICINE

## 2021-09-20 PROCEDURE — 99024 POSTOP FOLLOW-UP VISIT: CPT | Performed by: COLON & RECTAL SURGERY

## 2021-09-20 PROCEDURE — 6360000002 HC RX W HCPCS: Performed by: COLON & RECTAL SURGERY

## 2021-09-20 PROCEDURE — 6370000000 HC RX 637 (ALT 250 FOR IP): Performed by: COLON & RECTAL SURGERY

## 2021-09-20 PROCEDURE — 80048 BASIC METABOLIC PNL TOTAL CA: CPT

## 2021-09-20 PROCEDURE — 85027 COMPLETE CBC AUTOMATED: CPT

## 2021-09-20 PROCEDURE — 36415 COLL VENOUS BLD VENIPUNCTURE: CPT

## 2021-09-20 PROCEDURE — 2580000003 HC RX 258: Performed by: COLON & RECTAL SURGERY

## 2021-09-20 RX ORDER — SODIUM CHLORIDE 0.9 % (FLUSH) 0.9 %
5-40 SYRINGE (ML) INJECTION PRN
Status: DISCONTINUED | OUTPATIENT
Start: 2021-09-20 | End: 2021-09-20 | Stop reason: HOSPADM

## 2021-09-20 RX ORDER — SODIUM CHLORIDE 9 MG/ML
INJECTION, SOLUTION INTRAVENOUS CONTINUOUS
Status: DISCONTINUED | OUTPATIENT
Start: 2021-09-20 | End: 2021-09-20

## 2021-09-20 RX ORDER — ONDANSETRON 2 MG/ML
4 INJECTION INTRAMUSCULAR; INTRAVENOUS EVERY 6 HOURS PRN
Status: DISCONTINUED | OUTPATIENT
Start: 2021-09-20 | End: 2021-09-20 | Stop reason: HOSPADM

## 2021-09-20 RX ORDER — OXYCODONE HYDROCHLORIDE AND ACETAMINOPHEN 5; 325 MG/1; MG/1
2 TABLET ORAL EVERY 4 HOURS PRN
Status: DISCONTINUED | OUTPATIENT
Start: 2021-09-20 | End: 2021-09-20 | Stop reason: HOSPADM

## 2021-09-20 RX ORDER — ONDANSETRON 4 MG/1
4 TABLET, ORALLY DISINTEGRATING ORAL EVERY 8 HOURS PRN
Status: DISCONTINUED | OUTPATIENT
Start: 2021-09-20 | End: 2021-09-20 | Stop reason: HOSPADM

## 2021-09-20 RX ORDER — SODIUM CHLORIDE 0.9 % (FLUSH) 0.9 %
5-40 SYRINGE (ML) INJECTION EVERY 12 HOURS SCHEDULED
Status: DISCONTINUED | OUTPATIENT
Start: 2021-09-20 | End: 2021-09-20 | Stop reason: HOSPADM

## 2021-09-20 RX ORDER — OXYCODONE HYDROCHLORIDE AND ACETAMINOPHEN 5; 325 MG/1; MG/1
1 TABLET ORAL EVERY 6 HOURS PRN
Qty: 12 TABLET | Refills: 0 | Status: SHIPPED | OUTPATIENT
Start: 2021-09-20 | End: 2021-09-23

## 2021-09-20 RX ORDER — SODIUM CHLORIDE 9 MG/ML
25 INJECTION, SOLUTION INTRAVENOUS PRN
Status: DISCONTINUED | OUTPATIENT
Start: 2021-09-20 | End: 2021-09-20 | Stop reason: HOSPADM

## 2021-09-20 RX ORDER — OXYCODONE HYDROCHLORIDE AND ACETAMINOPHEN 5; 325 MG/1; MG/1
1 TABLET ORAL EVERY 4 HOURS PRN
Status: DISCONTINUED | OUTPATIENT
Start: 2021-09-20 | End: 2021-09-20 | Stop reason: HOSPADM

## 2021-09-20 RX ADMIN — SODIUM CHLORIDE: 9 INJECTION, SOLUTION INTRAVENOUS at 01:25

## 2021-09-20 RX ADMIN — OXYCODONE HYDROCHLORIDE AND ACETAMINOPHEN 2 TABLET: 5; 325 TABLET ORAL at 08:30

## 2021-09-20 RX ADMIN — KETOROLAC TROMETHAMINE 30 MG: 30 INJECTION, SOLUTION INTRAMUSCULAR; INTRAVENOUS at 03:08

## 2021-09-20 RX ADMIN — ENOXAPARIN SODIUM 40 MG: 40 INJECTION SUBCUTANEOUS at 08:31

## 2021-09-20 RX ADMIN — CEFAZOLIN SODIUM 2000 MG: 10 INJECTION, POWDER, FOR SOLUTION INTRAVENOUS at 01:26

## 2021-09-20 RX ADMIN — SODIUM CHLORIDE, PRESERVATIVE FREE 10 ML: 5 INJECTION INTRAVENOUS at 08:31

## 2021-09-20 ASSESSMENT — ENCOUNTER SYMPTOMS
PHOTOPHOBIA: 0
DIARRHEA: 0
SHORTNESS OF BREATH: 0
BACK PAIN: 0
RHINORRHEA: 0
ABDOMINAL PAIN: 1
SORE THROAT: 0
WHEEZING: 0
COUGH: 0
VOMITING: 0
NAUSEA: 0

## 2021-09-20 ASSESSMENT — PAIN SCALES - GENERAL
PAINLEVEL_OUTOF10: 5
PAINLEVEL_OUTOF10: 8

## 2021-09-20 NOTE — PROGRESS NOTES
Pt Name: Talon Turcios  Medical Record Number: [id number]  Date of Birth 1945   Admit date 2021  6:32 AM  Today's Date: 2021     ASSESSMENT  1. Hospital day # 1  2 postop day #1 incarcerated hernia repair with mesh  3. Tolerating liquids    PLAN  1. Discharge home    SUBJECTIVE  Chief complaint: Follow-up hernia repair  Afebrile, vital signs are stable. He denies any nausea or vomiting, passing flatus. He is tolerating a ADULT DIET; Regular; Low Fiber. His pain is well controlled on current medications. He has been ambulating in the halls. has a past medical history of BPH (benign prostatic hyperplasia), Bundle branch block, Hypertension, Mixed hyperlipidemia, and Osteoarthritis. CURRENT MEDS  Scheduled Meds:   sodium chloride flush  5-40 mL IntraVENous 2 times per day    enoxaparin  40 mg SubCUTAneous Daily     Continuous Infusions:   sodium chloride       PRN Meds:.sodium chloride flush, sodium chloride, ondansetron **OR** ondansetron, oxyCODONE-acetaminophen **OR** oxyCODONE-acetaminophen, HYDROmorphone **OR** HYDROmorphone, HYDROmorphone, labetalol, ketorolac    OBJECTIVE  CURRENT VITALS:  height is 6' (1.829 m) and weight is 205 lb (93 kg). His oral temperature is 97.9 °F (36.6 °C). His blood pressure is 111/58 (abnormal) and his pulse is 75. His respiration is 18 and oxygen saturation is 92%.    Temperature Range (24h):Temp: 97.9 °F (36.6 °C) Temp  Av.5 °F (35.8 °C)  Min: 96.4 °F (35.8 °C)  Max: 98 °F (36.7 °C)  BP Range (21A): Systolic (24FWC), UFB:090 , Min:58 , MFA:604     Diastolic (81GMU), NER:06, Min:40, Max:79    Pulse Range (24h): Pulse  Av.4  Min: 74  Max: 85  Respiration Range (24h): Resp  Av.9  Min: 0  Max: 21    GENERAL: alert, no distress  LUNGS: clear to ausculation, without wheezes, rales or rhonci  HEART: normal rate and regular rhythm  ABDOMEN: soft, non-tender, non-distended, bowel sounds present in all 4 quadrants and no guarding or peritoneal signs  EXTERMITY: no cyanosis, clubbing or edema  No intake/output data recorded. LABS  Recent Labs     09/19/21  0645 09/20/21  0545 09/20/21  0546   WBC 8.5  --  11.9*   HGB 16.3  --  14.4   HCT 47.0  --  41.8*     --  187    138  --    K 4.1 4.2  --     101  --    CO2 26 26  --    BUN 13 14  --    CREATININE 0.81 0.65*  --    CALCIUM 9.4 8.7  --       Recent Labs     09/19/21  0945   INR 1.2     Recent Labs     09/19/21  0645   AST 23   ALT 19   BILITOT 1.3*   LIPASE 18   LACTA 1.6       RADIOLOGY  CT ABDOMEN PELVIS W IV CONTRAST Additional Contrast? None    Result Date: 9/19/2021  EXAM:  CT ABDOMEN PELVIS W IV CONTRAST History: Midepigastric abdominal pain for 4 days. Technique: Multiple contiguous axial images were obtained of the abdomen and pelvis from the level of the lung bases through the ischial tuberosities with IV contrast. Multiplanar reformats were obtained. Delayed images obtained. Comparison: CT abdomen pelvis August 22, 2018 Findings: Bibasilar atelectasis and/or scarring. The gallbladder is surgically absent. The liver, spleen, stomach, pancreas, and adrenal glands are within normal limits. The kidneys enhance uniformly. No urinary tract calculi or hydronephrosis. Urinary bladder is well distended. The prostate is enlarged. Abdominal aorta is nonaneurysmal  and demonstrate atherosclerotic calcification . No retroperitoneal or abdominal/pelvic lymphadenopathy. There are multiple borderline mildly distended fluid-filled loops of small bowel, some of which demonstrate air-fluid levels, within the lower abdomen that measure up to 2.8 cm in diameter. There is atrophy of the right rectus abdominous musculature. There is a right anterolateral abdominal wall hernia of the lower abdomen that measures approximately 2.4 x 4 x 3 cm with a neck measuring approximately 2.4 x 2.2 cm. This hernia contains a short segment of small bowel.  The distended loops of bowel are proximal to this hernia and loops of bowel distal to this hernia are decompressed. Small amount of right lower quadrant ascites. Colonic diverticuli are identified, most significantly involving the sigmoid colon. No overt colonic mass or pericolonic inflammation. Appendix is within normal limits. No free fluid, loculated fluid collection, or pneumoperitoneum. Small fat-containing right inguinal hernia. No acute osseous abnormality. Degenerative changes of the thoracolumbar spine. Postsurgical changes of posterior spinal fusion at L4-L5. Findings compatible with partial small bowel obstruction secondary to an anterolateral right lower abdominal wall hernia containing a short segment of small bowel. All CT scans at this facility use dose modulation, iterative reconstruction, and/or weight based dosing when appropriate to reduce radiation dose to as low as reasonably achievable.     Electronically signed by Demetrios Frankel, MD on 9/20/2021 at 10:15 AM

## 2021-09-20 NOTE — CONSULTS
Consult Note    Reason for Consult: Medical management  Requesting Physician:  Loyda Loco MD    HISTORY OF PRESENT ILLNESS:    Patient being admitted for SBO I had to incarcerated hernia. Patient is status post surgical repair. Patient states that his pain is tolerable and he is ready to go home with no other complaints. Patient denies any chest pain, nausea, or vomiting. Patient is tolerating clear liquids. Patient reports that he takes medications for hypertension and hyperlipidemia. Patient also has a loop recorder related to nonischemic cardiomyopathy, palpitations. Patient currently resting in position of comfort with no signs or symptoms of distress. Respirations are even and unlabored skin is warm dry            Past Medical History:   Diagnosis Date    BPH (benign prostatic hyperplasia)     Bundle branch block     [facility]    Hypertension     Mixed hyperlipidemia     Osteoarthritis        Past Surgical History:   Procedure Laterality Date    BACK SURGERY  08/07/2013    lumbar laminectomy with fusion    BACK SURGERY  1978    CHOLECYSTECTOMY, OPEN  2003    INSERTABLE CARDIAC MONITOR  05/2021    UT REPAIR INCISIONAL HERNIA,REDUCIBLE N/A 9/28/2018    REPAIR VENTRAL HERNIA  WITH MESH performed by Michelle Gonzalez MD at [address] Left 01/29/2015       Prior to Admission medications    Medication Sig Start Date End Date Taking?  Authorizing Provider   Sacubitril-Valsartan (ENTRESTO PO) Take by mouth 2 times daily 97/103mg pt takes 1/2 a pill twice a day   Yes Historical Provider, MD   magnesium 30 MG tablet Take 30 mg by mouth 2 times daily   Yes Historical Provider, MD   aspirin 81 MG tablet Take 81 mg by mouth daily   Yes Historical Provider, MD   Multiple Vitamins-Minerals (THERAPEUTIC MULTIVITAMIN-MINERALS) tablet Take 1 tablet by mouth daily   Yes Historical Provider, MD   pravastatin (PRAVACHOL) 20 MG tablet Take 10 mg by mouth daily  6/11/18  Yes Historical Provider, MD       Scheduled Meds:   sodium chloride flush  5-40 mL IntraVENous 2 times per day    enoxaparin  40 mg SubCUTAneous Daily     Continuous Infusions:   sodium chloride      sodium chloride 75 mL/hr at 21 0125     PRN Meds:sodium chloride flush, sodium chloride, ondansetron **OR** ondansetron, oxyCODONE-acetaminophen **OR** oxyCODONE-acetaminophen, HYDROmorphone **OR** HYDROmorphone, HYDROmorphone, labetalol, ketorolac    No Known Allergies    Social History     Socioeconomic History    Marital status:      Spouse name: Not on file    Number of children: Not on file    Years of education: Not on file    Highest education level: Not on file   Occupational History    Not on file   Tobacco Use    Smoking status: Former Smoker     Quit date: 1970     Years since quittin.0    Smokeless tobacco: Never Used   Substance and Sexual Activity    Alcohol use: Yes     Alcohol/week: 5.0 standard drinks     Types: 5 Glasses of wine per week    Drug use: No    Sexual activity: Not on file   Other Topics Concern    Not on file   Social History Narrative    Not on file     Social Determinants of Health     Financial Resource Strain:     Difficulty of Paying Living Expenses:    Food Insecurity:     Worried About Running Out of Food in the Last Year:     [address] in the Last Year:    Transportation Needs:     Lack of Transportation (Medical):      Lack of Transportation (Non-Medical):    Physical Activity:     Days of Exercise per Week:     Minutes of Exercise per Session:    Stress:     Feeling of Stress :    Social Connections:     Frequency of Communication with Friends and Family:     Frequency of Social Gatherings with Friends and Family:     Attends [employer] Services:     Active Member of Clubs or Organizations:     Attends Club or Organization Meetings:     Marital Status:    Intimate Partner Violence:     Fear of Current or Ex-Partner:     Emotionally Abused:     Physically Abused:     Sexually Abused:        Family History   Problem Relation Age of Onset    Stroke Father     Heart Disease Brother     No Known Problems Daughter     No Known Problems Son        Review Of Systems:   Review of Systems   Constitutional: Negative for activity change, chills, fatigue and fever. HENT: Negative for congestion, ear pain, rhinorrhea and sore throat. Eyes: Negative for photophobia and visual disturbance. Respiratory: Negative for cough, shortness of breath and wheezing. Cardiovascular: Negative for chest pain. Gastrointestinal: Positive for abdominal pain ( Mild and tolerable). Negative for diarrhea, nausea and vomiting. Endocrine: Negative for polydipsia, polyphagia and polyuria. Genitourinary: Negative for dysuria, flank pain, hematuria and urgency. Musculoskeletal: Negative for back pain, myalgias and neck stiffness. Skin: Negative for rash and wound. Allergic/Immunologic: Negative for immunocompromised state. Neurological: Negative for dizziness and headaches. Psychiatric/Behavioral: Negative for behavioral problems and confusion. ROS as noted in HPI, 12 point ROS reviewed and otherwise negative. Physical Exam:  Vitals:    09/19/21 1300 09/19/21 1305 09/19/21 1330 09/19/21 1959   BP: 136/67 135/70 125/70 (!) 111/58   Pulse: 82 77 77 74   Resp: 10 19 18 18   Temp:   97.1 °F (36.2 °C) 97.9 °F (36.6 °C)   TempSrc:   Oral Oral   SpO2: 97% 97% 93% 92%   Weight:       Height:           Physical Exam  Vitals and nursing note reviewed. Constitutional:       General: He is not in acute distress. Appearance: He is not ill-appearing. HENT:      Head: Normocephalic. Mouth/Throat:      Mouth: Mucous membranes are moist.   Eyes:      Pupils: Pupils are equal, round, and reactive to light. Cardiovascular:      Rate and Rhythm: Normal rate and regular rhythm. Pulses: Normal pulses.       Heart sounds: Normal heart sounds. Pulmonary:      Effort: Pulmonary effort is normal. No respiratory distress. Breath sounds: Normal breath sounds. No wheezing, rhonchi or rales. Abdominal:      General: Bowel sounds are normal. There is no distension. Tenderness: There is no abdominal tenderness. There is no guarding. Comments: Abdominal binder on. Surgical dressings clean and dry. Musculoskeletal:      Right lower leg: No edema. Left lower leg: No edema. Skin:     General: Skin is warm and dry. Capillary Refill: Capillary refill takes less than 2 seconds. Neurological:      Mental Status: He is alert and oriented to person, place, and time.    Psychiatric:         Mood and Affect: Mood normal.          Labs:  Recent Results (from the past 72 hour(s))   Comprehensive Metabolic Panel    Collection Time: 09/19/21  6:45 AM   Result Value Ref Range    Sodium 138 135 - 144 mEq/L    Potassium 4.1 3.4 - 4.9 mEq/L    Chloride 101 95 - 107 mEq/L    CO2 26 20 - 31 mEq/L    Anion Gap 11 9 - 15 mEq/L    Glucose 131 (H) 70 - 99 mg/dL    BUN 13 8 - 23 mg/dL    CREATININE 0.81 0.70 - 1.20 mg/dL    GFR Non-African American >60.0 >60    GFR  >60.0 >60    Calcium 9.4 8.5 - 9.9 mg/dL    Total Protein 6.4 6.3 - 8.0 g/dL    Albumin 4.1 3.5 - 4.6 g/dL    Total Bilirubin 1.3 (H) 0.2 - 0.7 mg/dL    Alkaline Phosphatase 92 35 - 104 U/L    ALT 19 0 - 41 U/L    AST 23 0 - 40 U/L    Globulin 2.3 2.3 - 3.5 g/dL   CBC Auto Differential    Collection Time: 09/19/21  6:45 AM   Result Value Ref Range    WBC 8.5 4.8 - 10.8 K/uL    RBC 4.81 4.70 - 6.10 M/uL    Hemoglobin 16.3 14.0 - 18.0 g/dL    Hematocrit 47.0 42.0 - 52.0 %    MCV 97.7 80.0 - 100.0 fL    MCH 33.9 (H) 27.0 - 31.3 pg    MCHC 34.7 33.0 - 37.0 %    RDW 13.1 11.5 - 14.5 %    Platelets 463 354 - 188 K/uL    Neutrophils % 80.2 %    Lymphocytes % 10.4 %    Monocytes % 8.5 %    Eosinophils % 0.7 %    Basophils % 0.2 %    Neutrophils Absolute 6.8 (H) 1.4 - 6.5 K/uL Lymphocytes Absolute 0.9 (L) 1.0 - 4.8 K/uL    Monocytes Absolute 0.7 0.2 - 0.8 K/uL    Eosinophils Absolute 0.1 0.0 - 0.7 K/uL    Basophils Absolute 0.0 0.0 - 0.2 K/uL   Lactic Acid, Plasma    Collection Time: 09/19/21  6:45 AM   Result Value Ref Range    Lactic Acid 1.6 0.5 - 2.2 mmol/L   Lipase    Collection Time: 09/19/21  6:45 AM   Result Value Ref Range    Lipase 18 12 - 95 U/L   Urine Reflex to Culture    Collection Time: 09/19/21  6:45 AM    Specimen: Urine, clean catch   Result Value Ref Range    Color, UA Yellow Straw/Yellow    Clarity, UA Clear Clear    Glucose, Ur Negative Negative mg/dL    Bilirubin Urine Negative Negative    Ketones, Urine Negative Negative mg/dL    Specific Gravity, UA 1.040 1.005 - 1.030    Blood, Urine Negative Negative    pH, UA 7.5 5.0 - 9.0    Protein, UA 30 (A) Negative mg/dL    Urobilinogen, Urine 1.0 <2.0 E.U./dL    Nitrite, Urine Negative Negative    Leukocyte Esterase, Urine Negative Negative    Urine Reflex to Culture Not Indicated    Troponin    Collection Time: 09/19/21  6:45 AM   Result Value Ref Range    Troponin <0.010 0.000 - 0.010 ng/mL   CK    Collection Time: 09/19/21  6:45 AM   Result Value Ref Range    Total  0 - 190 U/L   Microscopic Urinalysis    Collection Time: 09/19/21  6:45 AM   Result Value Ref Range    Bacteria, UA Negative Negative /HPF    Hyaline Casts, UA 1-3 0 - 5 /HPF    WBC, UA 0-2 0 - 5 /HPF    RBC, UA 3-5 (A) 0 - 5 /HPF    Epithelial Cells, UA 0-2 0 - 5 /HPF   EKG 12 Lead - Chest Pain    Collection Time: 09/19/21  6:48 AM   Result Value Ref Range    Ventricular Rate 52 BPM    Atrial Rate 52 BPM    P-R Interval 184 ms    QRS Duration 152 ms    Q-T Interval 438 ms    QTc Calculation (Bazett) 407 ms    P Axis 53 degrees    R Axis -61 degrees    T Axis 105 degrees   Protime-INR    Collection Time: 09/19/21  9:45 AM   Result Value Ref Range    Protime 15.4 (H) 12.3 - 14.9 sec    INR 1.2    APTT    Collection Time: 09/19/21  9:45 AM   Result Value Ref Range    aPTT 29.0 24.4 - 36.8 sec   COVID-19, Rapid    Collection Time: 09/19/21 10:04 AM    Specimen: Nasopharyngeal Swab   Result Value Ref Range    SARS-CoV-2, NAAT Not Detected Not Detected       Data:    CT ABDOMEN PELVIS W IV CONTRAST Additional Contrast? None    Result Date: 9/19/2021  EXAM:  CT ABDOMEN PELVIS W IV CONTRAST History: Midepigastric abdominal pain for 4 days. Technique: Multiple contiguous axial images were obtained of the abdomen and pelvis from the level of the lung bases through the ischial tuberosities with IV contrast. Multiplanar reformats were obtained. Delayed images obtained. Comparison: CT abdomen pelvis August 22, 2018 Findings: Bibasilar atelectasis and/or scarring. The gallbladder is surgically absent. The liver, spleen, stomach, pancreas, and adrenal glands are within normal limits. The kidneys enhance uniformly. No urinary tract calculi or hydronephrosis. Urinary bladder is well distended. The prostate is enlarged. Abdominal aorta is nonaneurysmal  and demonstrate atherosclerotic calcification . No retroperitoneal or abdominal/pelvic lymphadenopathy. There are multiple borderline mildly distended fluid-filled loops of small bowel, some of which demonstrate air-fluid levels, within the lower abdomen that measure up to 2.8 cm in diameter. There is atrophy of the right rectus abdominous musculature. There is a right anterolateral abdominal wall hernia of the lower abdomen that measures approximately 2.4 x 4 x 3 cm with a neck measuring approximately 2.4 x 2.2 cm. This hernia contains a short segment of small bowel. The distended loops of bowel are proximal to this hernia and loops of bowel distal to this hernia are decompressed. Small amount of right lower quadrant ascites. Colonic diverticuli are identified, most significantly involving the sigmoid colon. No overt colonic mass or pericolonic inflammation. Appendix is within normal limits.  No free fluid, loculated fluid collection, or pneumoperitoneum. Small fat-containing right inguinal hernia. No acute osseous abnormality. Degenerative changes of the thoracolumbar spine. Postsurgical changes of posterior spinal fusion at L4-L5. Findings compatible with partial small bowel obstruction secondary to an anterolateral right lower abdominal wall hernia containing a short segment of small bowel. All CT scans at this facility use dose modulation, iterative reconstruction, and/or weight based dosing when appropriate to reduce radiation dose to as low as reasonably achievable. Assessment/Plan:    Principal Problem:  Incarcerated incisional hernia: Status post surgical repair of SBO related to incarcerated hernia after ABD pain x4 days. ABD CT shows SBO with incarcerated hernia. To be managed by general surgery. Active Problems:  Hypertension: Patient on home meds to control. We will resume home meds. HLD: Patient on statin. We will resume statin    Electronically signed by FRANCHESKA Syed CNP on 9/20/21 at 4:02 AM DEMETRIA Ayala MD - supervising physician

## 2021-09-20 NOTE — FLOWSHEET NOTE
Discharge instructions reviewed with patint and wife at bedside. Patietn is aware of upcoming apts and paper script given Education given on procedure and Oxycodone. Dressing changed to left abd area, 15 Staple noted and well approximated, cleansed with NS and DSD applied. Two small incision noted to left lateral abd both with tow staple a piece. Cleansed with NS and band aid applied. Patient tolerated well Dressings sent home. IV removed prior to dc. Both patient and wife verbalized understanding of discharge.

## 2021-09-20 NOTE — FLOWSHEET NOTE
Pt arrive to floor via cart assisted into bed and oriented to room. Pt denies any discomfort at this time admission completed reviewed medication and history. Pt watching football game. Pt was up to the bathroom voided and then sat up in chair with next to wife. Requested pain medication at 1845 and toradol was given by RN. Electronically signed by Servando Oh LPN on 2/44/9651 at 76:73 PM

## 2021-09-20 NOTE — DISCHARGE SUMMARY
Physician Discharge Summary     Patient ID:  Jose Renae  [id number]  67 y.o.  1945    Admit date: 9/19/2021    Discharge date and time: 9/20/2021 12:22 PM     Admitting Physician: Jamee Beck MD     Discharge Physician: Same    Admission Diagnoses: Small bowel obstruction ([facility] [address] [K15.045]  Hernia of anterior abdominal wall [K43.9]  Recurrent incisional hernia with obstruction [K43.0]    Discharge Diagnoses: Same    Admission Condition: fair    Discharged Condition: good    Indication for Admission: Small bowel obstruction from incarcerated recurrent incisional hernia    Hospital Course: Patient was admitted from the emergency department with abdominal pain with small bowel obstruction secondary to recurrent incisional hernia. The details of the admission can be found in the admitting history and physical.    He was taken to the operating room that day for repair of that obstructing recurrent incisional hernia, the details of which can be found in the operative report. He was taken to the floor postoperatively. He was started on clear liquids which he tolerated without problems. He was advanced to a low fiber regular diet the following morning. On postop day #1 his IV fluids were hep-locked. He was passing gas without any nausea vomiting or distention. Discharge instructions were given regarding activity, medication, follow-up, and wound care. He will see me in the office postoperatively for staple removal.  All questions were answered. He was pleased with this hospital course. Consults: Hospitalist    Significant Diagnostic Studies: labs: CBC, CMP    Treatments: antibiotics: Ancef and surgery: Repair of obstructing recurrent incisional hernia with mesh    Discharge Exam:  See exam dated 9/20/2021    Disposition: home    In process/preliminary results:  Outstanding Order Results     No orders found for last 30 day(s).           Patient Instructions:   Discharge Medication List as of 9/20/2021 11:19 AM      START taking these medications    Details   oxyCODONE-acetaminophen (PERCOCET) 5-325 MG per tablet Take 1 tablet by mouth every 6 hours as needed for Pain for up to 3 days. , Disp-12 tablet, R-[id number]         CONTINUE these medications which have NOT CHANGED    Details   Sacubitril-Valsartan (ENTRESTO PO) Take by mouth 2 times daily 97/103mg pt takes 1/2 a pill twice a dayHistorical Med      magnesium 30 MG tablet Take 30 mg by mouth 2 times dailyHistorical Med      aspirin 81 MG tablet Take 81 mg by mouth dailyHistorical Med      Multiple Vitamins-Minerals (THERAPEUTIC MULTIVITAMIN-MINERALS) tablet Take 1 tablet by mouth dailyHistorical Med      pravastatin (PRAVACHOL) 20 MG tablet Take 10 mg by mouth daily Historical Med         STOP taking these medications       Cholecalciferol 2000 units CAPS Comments:   Reason for Stopping:         lisinopril (PRINIVIL;ZESTRIL) 5 MG tablet Comments:   Reason for Stopping:             Activity: activity as tolerated  Diet: regular diet  Wound Care: keep wound clean and dry    Follow-up with Leonel Akbar in 8 days.     Anisha Pederson  9/20/2021  12:58 PM

## 2021-09-23 ASSESSMENT — ENCOUNTER SYMPTOMS
SORE THROAT: 0
ABDOMINAL PAIN: 1
DIARRHEA: 0
ABDOMINAL DISTENTION: 0
CONSTIPATION: 0
SHORTNESS OF BREATH: 0
EYE DISCHARGE: 0
VOMITING: 0
COLOR CHANGE: 0
NAUSEA: 0
RHINORRHEA: 0

## 2021-09-28 ENCOUNTER — OFFICE VISIT (OUTPATIENT)
Dept: SURGERY | Age: 76
End: 2021-09-28

## 2021-09-28 VITALS
WEIGHT: 205 LBS | HEART RATE: 91 BPM | OXYGEN SATURATION: 98 % | BODY MASS INDEX: 27.77 KG/M2 | TEMPERATURE: 97.5 F | HEIGHT: 72 IN

## 2021-09-28 DIAGNOSIS — K43.0 INCARCERATED INCISIONAL HERNIA: Primary | ICD-10-CM

## 2021-09-28 PROCEDURE — 99024 POSTOP FOLLOW-UP VISIT: CPT | Performed by: COLON & RECTAL SURGERY

## 2021-09-28 NOTE — PROGRESS NOTES
Subjective:      Patient ID: Albert Guerra is a 67 y.o. male who presents for:  Chief Complaint   Patient presents with    Post-Op Check       He returns to the office 9 days out from an incarcerated incisional hernia repair with mesh as well as a diagnostic laparoscopy. He is doing well. His bowels are now working. His abdominal distention has relieved itself. He denies nausea or vomiting. Denies fever. Past Medical History:   Diagnosis Date    BPH (benign prostatic hyperplasia)     Bundle branch block     Followed by [facility]    Hypertension     Mixed hyperlipidemia     Osteoarthritis      Past Surgical History:   Procedure Laterality Date    BACK SURGERY  2013    lumbar laminectomy with fusion   [address] [facility], OPEN      INSERTABLE CARDIAC MONITOR  2021    LAPAROTOMY N/A 2021    LAPAROTOMY EXPLORATORY W/ REPAIR OF INCARCERATED HERNIA WITH MESH, EXPLORATORY LAPAROSCOPY WITH LYSIS OF ADHESIONS performed by Kong Peña MD at [address] [facility] N/A 2018    REPAIR VENTRAL HERNIA  WITH MESH performed by Pedro Fleming MD at [address] [facility] 2015     Social History     Socioeconomic History    Marital status:      Spouse name: Not on file    Number of children: Not on file    Years of education: Not on file    Highest education level: Not on file   Occupational History    Not on file   Tobacco Use    Smoking status: Former Smoker     Quit date: 1970     Years since quittin.0    Smokeless tobacco: Never Used   Substance and Sexual Activity    Alcohol use:  Yes     Alcohol/week: 5.0 standard drinks     Types: 5 Glasses of wine per week    Drug use: No    Sexual activity: Not on file   Other Topics Concern    Not on file   Social History Narrative    Not on file     Social Determinants of Health     Financial Resource Strain:     Difficulty of Paying Living Expenses:    Food Insecurity:     Worried About Running Out of Food in the Last Year:     [address] in the Last Year:    Transportation Needs:     Lack of Transportation (Medical):  Lack of Transportation (Non-Medical):    Physical Activity:     Days of Exercise per Week:     Minutes of Exercise per Session:    Stress:     Feeling of Stress :    Social Connections:     Frequency of Communication with Friends and Family:     Frequency of Social Gatherings with Friends and Family:     Attends Jehovah's witness Services:     Active Member of Clubs or Organizations:     Attends Club or Organization Meetings:     Marital Status:    Intimate Partner Violence:     Fear of Current or Ex-Partner:     Emotionally Abused:     Physically Abused:     Sexually Abused:      Family History   Problem Relation Age of Onset    Stroke Father     Heart Disease Brother     No Known Problems Daughter     No Known Problems Son      Allergies:  Patient has no known allergies. Review of Systems    Objective:    Pulse 91   Temp 97.5 °F (36.4 °C) (Temporal)   Ht 6' (1.829 m)   Wt 205 lb (93 kg)   SpO2 98%   BMI 27.80 kg/m²     Physical Exam  On exam his abdomen is soft and nondistended. Half the staples were removed. The staples from his diagnostic laparoscopy were removed. There are no signs of infection or any skin changes. There is no evidence of hernia recurrence. Assessment/Plan:          Diagnosis Orders   1. Incarcerated incisional hernia       He will return back to see me in the office in the next 3 to 7 days to have the remainder of his staples removed. All questions were answered regarding activity as well as his abdominal binder. Please note this report has beenpartially produced using speech recognition software and may cause contain errors related to that system including grammar, punctuation and spelling as well as words and phrases that may seem inappropriate.  If there arequestions or concerns please feel free to contact me to clarify

## 2021-10-01 ENCOUNTER — OFFICE VISIT (OUTPATIENT)
Dept: SURGERY | Age: 76
End: 2021-10-01

## 2021-10-01 VITALS
HEART RATE: 77 BPM | BODY MASS INDEX: 27.77 KG/M2 | HEIGHT: 72 IN | OXYGEN SATURATION: 98 % | WEIGHT: 205 LBS | TEMPERATURE: 96.2 F

## 2021-10-01 DIAGNOSIS — K43.0 INCARCERATED INCISIONAL HERNIA: Primary | ICD-10-CM

## 2021-10-01 PROCEDURE — 99024 POSTOP FOLLOW-UP VISIT: CPT | Performed by: COLON & RECTAL SURGERY

## 2021-10-01 NOTE — PROGRESS NOTES
Subjective:      Patient ID: Dane Samuels is a 67 y.o. male who presents for:  Chief Complaint   Patient presents with    Post-Op Check       He returns to the office almost 2 weeks out from an incarcerated incisional hernia repair with mesh. He has been doing well. His activity is improving. His diet is doing well. He denies fever. Past Medical History:   Diagnosis Date    BPH (benign prostatic hyperplasia)     Bundle branch block     Followed by [facility]    Hypertension     Mixed hyperlipidemia     Osteoarthritis      Past Surgical History:   Procedure Laterality Date    BACK SURGERY  2013    lumbar laminectomy with fusion   [address] [facility], OPEN      INSERTABLE CARDIAC MONITOR  2021    LAPAROTOMY N/A 2021    LAPAROTOMY EXPLORATORY W/ REPAIR OF INCARCERATED HERNIA WITH MESH, EXPLORATORY LAPAROSCOPY WITH LYSIS OF ADHESIONS performed by Mirtha Galarza MD at [address] [place] [address] N/A 2018    REPAIR VENTRAL HERNIA  WITH MESH performed by Fauzia Barnett MD at [address] [facility] 2015     Social History     Socioeconomic History    Marital status:      Spouse name: Not on file    Number of children: Not on file    Years of education: Not on file    Highest education level: Not on file   Occupational History    Not on file   Tobacco Use    Smoking status: Former Smoker     Quit date: 1970     Years since quittin.0    Smokeless tobacco: Never Used   Substance and Sexual Activity    Alcohol use:  Yes     Alcohol/week: 5.0 standard drinks     Types: 5 Glasses of wine per week    Drug use: No    Sexual activity: Not on file   Other Topics Concern    Not on file   Social History Narrative    Not on file     Social Determinants of Health     Financial Resource Strain:     Difficulty of Paying Living Expenses:    Food Insecurity:     Worried About Running Out of Food in the Last Year:    [address] in the Last Year:    Transportation Needs:     Lack of Transportation (Medical):  Lack of Transportation (Non-Medical):    Physical Activity:     Days of Exercise per Week:     Minutes of Exercise per Session:    Stress:     Feeling of Stress :    Social Connections:     Frequency of Communication with Friends and Family:     Frequency of Social Gatherings with Friends and Family:     Attends [employer]ist Services:     Active Member of Clubs or Organizations:     Attends Club or Organization Meetings:     Marital Status:    Intimate Partner Violence:     Fear of Current or Ex-Partner:     Emotionally Abused:     Physically Abused:     Sexually Abused:      Family History   Problem Relation Age of Onset    Stroke Father     Heart Disease Brother     No Known Problems Daughter     No Known Problems Son      Allergies:  Patient has no known allergies. Review of Systems    Objective:    Pulse 77   Temp 96.2 °F (35.7 °C) (Temporal)   Ht 6' (1.829 m)   Wt 205 lb (93 kg)   SpO2 98%   BMI 27.80 kg/m²     Physical Exam  On exam his incision looks fine. The remainder of his staples were removed. There is no evidence of infection. There is no hematoma or seroma. The incision looks fine. His abdomen is soft and nondistended. Assessment/Plan:          Diagnosis Orders   1. Incarcerated incisional hernia       He will return back to see me in the office as needed. Wound care and activity instructions were given. If there are new problems or questions that arise, or any way I can be of further assistance, I asked him to call. Please note this report has beenpartially produced using speech recognition software and may cause contain errors related to that system including grammar, punctuation and spelling as well as words and phrases that may seem inappropriate.  If there arequestions or concerns please feel free to contact me to clarify

## 2022-05-01 DIAGNOSIS — M48.061 SPINAL STENOSIS OF LUMBAR REGION AT MULTIPLE LEVELS: ICD-10-CM

## 2022-05-01 DIAGNOSIS — R29.898 LEG WEAKNESS, BILATERAL: Primary | ICD-10-CM

## 2022-05-10 ENCOUNTER — HOSPITAL ENCOUNTER (OUTPATIENT)
Dept: PHYSICAL THERAPY | Age: 77
Setting detail: THERAPIES SERIES
Discharge: HOME | End: 2022-05-10
Payer: MEDICARE

## 2022-05-10 PROCEDURE — 97162 PT EVAL MOD COMPLEX 30 MIN: CPT

## 2022-05-10 PROCEDURE — 97110 THERAPEUTIC EXERCISES: CPT

## 2022-05-10 NOTE — PROGRESS NOTES
[address]athrin [address]     Ph: [phone]  Fax: [phone]      [x] Certification  [] Recertification [x]  Plan of Care  [] Progress Note [] Discharge      Referring Provider: BELKYS Davidson  Referring Provider (secondary): none   From:  [facility] [address]  Patient: Cynthia Tamez (66 y.o. male) : 1945 Date: 05/10/2022   Medical Diagnosis: Other symptoms and signs involving the musculoskeletal system [R29.898]  Spinal stenosis, lumbar region without neurogenic claudication [M48.061] leg weakness B, spinal stenosis of lumbar region at multiple levels  Treatment Diagnosis: decreased hamstring flexibility, decreased B LE and core strength, decreased posture, decreased activity tolerance for amb and yard work    Plan of Care/Certification Expiration Date: : 22   Progress Report Period from:  5/10/2022  to 5/10/2022    Visits to Date: 1 No Show: 0 Cancelled Appts: 0    OBJECTIVE:   Short Term Goals - Time Frame for Short term goals: 2 wks    Goals Current/Discharge status  Status   Short term goal 1: Pt will demonstrate improved trunk extensor, abdominal, and B LE strength >/= 4+/5 for carryover to improved tolerance for walking and yard work.   Strength LLE  L Hip Flexion: 4-/5  L Hip Extension: 4-/5  L Hip ABduction: 2+/5  L Hip ADduction: 5/5  L Hip Internal Rotation: 3/5  L Hip External Rotation: 3/5  L Knee Flexion: 5/5  L Knee Extension: 5/5  Strength RLE  R Hip Flexion: 4+/5  R Hip Extension: 4-/5  R Hip ABduction: 3-/5 (pt compensates with hip flexion)  R Hip ADduction: 5/5  R Hip Internal Rotation: 4-/5  R Hip External Rotation: 4-/5  R Knee Flexion: 5/5  R Knee Extension: 5/5         General Strength Testing LE:  (isometric abdominal strength 3/5; trunk extensor strength 3/5)     New     Long Term Goals - Time Frame for Long term goals : 5 wks  Goals Current/ Discharge status Status   Long term goal 1: Pt will demonstrate indep and compliance with % of the time for self management of LE weakness. HEP initiated New   Long term goal 2: Pt will demonstrate improved score on LEFS >/= 60/80 for improved pt quality of life. LEFS 44/80     New     Body Structures, Functions, Activity Limitations Requiring Skilled Therapeutic Intervention: Decreased functional mobility ,Decreased ROM,Decreased strength,Decreased endurance,Decreased posture  Assessment: Pt is 67 y.o. male with c/o B LE weakness and lateral hip instability which has recently worsened. Pt exhibits impairments including decreased hamstring flexibility, decreased B LE and core strength, decreased posture, decreased activity tolerance for amb and yard work. Pt requires continued PT to address these impairments, progress strength and ROM, and provide pt with HEP for self management of symptoms. Therapy Prognosis: Good      PT Education: Goals;PT Role;Plan of Care;Evaluative findings;Home Exercise Program    PLAN: [x] Evaluate and Treat  Frequency/Duration:  Plan Frequency: 2  Plan weeks: 5  Current Treatment Recommendations: Strengthening,ROM,Balance training,Functional mobility training,Transfer training,Endurance training,Gait training,Stair training,Neuromuscular re-education,Manual Therapy - Soft Tissue Mobilization,Pain management,Home exercise program,Safety education & training,Patient/Caregiver education & training,Equipment evaluation, education, & procurement,Modalities,Positioning,Therapeutic activities  Plan Comment: pt ok for ice                    Patient Status:[x] Continue/ Initiate plan of Care    [] Discharge PT. Recommend pt continue with HEP. [] Additional visits requested, Please re-certify for additional visits:    [] Hold         Signature: Electronically signed by Mimi Low PT on 5/10/22 at 12:41 PM EDT      If you have any questions or concerns, please don't hesitate to call.   Thank you for your referral.    I have reviewed this plan of care and certify a need for medically necessary rehabilitation services.     Physician Signature:__________________________________________________________  Date:  Please sign and return

## 2022-05-10 NOTE — PROGRESS NOTES
[address] [facility]  PHYSICAL THERAPY EVALUATION      Physical Therapy: Initial Evaluation    Patient: James Solis (16 y.o.     male)   Examination Date:   Plan of Care Certification Period: 5/10/2022 to 22  Progress Note Counter: 1/10 (PN due 2022)   :  1945 ;    Confirmed: Yes MRN: [id number]  CSN: [id number]   Insurance: Payor: MEDICARE / Plan: MEDICARE PART A AND B / Product Type: *No Product type* /   Insurance ID: [id number] - (Medicare) Secondary Insurance (if applicable):  HU[employer]   Referring Physician: BELKYS Truong none   PCP: Kina Davila MD Visits to Date/Visits Approved:  (BMN)    No Show/Cancelled Appts: 0 / 0     Medical Diagnosis: Other symptoms and signs involving the musculoskeletal system [R29.898]  Spinal stenosis, lumbar region without neurogenic claudication [M48.061] leg weakness B, spinal stenosis of lumbar region at multiple levels  Treatment Diagnosis: decreased hamstring flexibility, decreased B LE and core strength, decreased posture, decreased activity tolerance for amb and yard work     [facility]   Patient Assessed for Rehabilitation Services: Yes       Medical History:     Past Medical History:   Diagnosis Date    BPH (benign prostatic hyperplasia)     [facility]    Hypertension     Mixed hyperlipidemia     Osteoarthritis      Surgical History:   Past Surgical History:   Procedure Laterality Date    BACK SURGERY  2013    lumbar laminectomy with fusion   [address] [facility], OPEN      INSERTABLE CARDIAC MONITOR  2021    LAPAROTOMY N/A 2021    LAPAROTOMY EXPLORATORY W/ REPAIR OF INCARCERATED HERNIA WITH MESH, EXPLORATORY LAPAROSCOPY WITH LYSIS OF ADHESIONS performed by Gisselle Sims MD at [address] [facility] [address] N/A 2018    REPAIR VENTRAL HERNIA  WITH MESH performed by Hans Cunningham MD at [address] [facility] [address] 01/29/2015       Medications:   Current Outpatient Medications:     Sacubitril-Valsartan (ENTRESTO PO), Take by mouth 2 times daily 97/103mg pt takes 1/2 a pill twice a day, Disp: , Rfl:     magnesium 30 MG tablet, Take 30 mg by mouth 2 times daily, Disp: , Rfl:     aspirin 81 MG tablet, Take 81 mg by mouth daily, Disp: , Rfl:     Multiple Vitamins-Minerals (THERAPEUTIC MULTIVITAMIN-MINERALS) tablet, Take 1 tablet by mouth daily, Disp: , Rfl:     pravastatin (PRAVACHOL) 20 MG tablet, Take 10 mg by mouth daily , Disp: , Rfl:   Allergies: Patient has no known allergies. SUBJECTIVE EXAMINATION     History obtained from[de-identified] Patient,      Family/Caregiver Present: No    Subjective History: Onset Date:  (gradual over last few years)  Subjective: Pt to PT due to \"snapping\" IT bands R/L at hip joint- L worse than R and LE weakness with stair negotiation. Pt c/o requiring increased time to complete all IADLs (washing car, yard work)- pt attributes to LE weakness. Pt has not tried any treatments. Goes to gym at [employer] 4-5 times per week- has not been to gym in 1 month due to busy at home. No falls in last 1 year.   Additional Pertinent Hx (if applicable): spinal fusion X 2 (40 years ago and 6 years ago), high blood pressure, L TKA (2015), hernia repair, bundle branch block, HTN, OA, R torn rotator cuff   Prior diagnostic testing[de-identified]  (none recent)      Learning/Language: Learning  Does the patient/guardian have any barriers to learning?: No barriers     Pain Screening    Pain Screening  Patient Currently in Pain: Denies    Functional Status    Social History:    Social History  Lives With: Spouse  Type of Home: House  Home Layout: Two level  Bathroom Shower/Tub: Walk-in shower    Occupation/Interests:   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    Prior Level of Function:     Independent     Current Level of Function:          ADL Assistance: Independent  Homemaking Assistance: Independent  Homemaking Responsibilities: Yes  Ambulation Assistance: Independent  Transfer Assistance: Independent  Active : Yes    OBJECTIVE EXAMINATION   Observations:   General Observations  Posture:  Other (comment)  Description: maintains PPT in standing, B knee flexion in standing, shoulders posterior to hips, ribs shifted R, decreased lumbar and thoracic curves, head foward    Palpation:   Right Hip Palpation: no pain with palpation greater trochanter  Left Hip Palpation: no pain with palpation greater trochanter; palpated L muscle movement over greater trochanter with bridge    Mobility:   Ambulation  Surface: carpet  Device: No Device  Assistance: Independent  Quality of Gait: R shoulder/rib cage increased posterior thrust during pre-swing L LE, increased arm swing R UE, decreased lateral hip stability in SLS R/L, toe out entire gait cycle  Distance: short distances in gym  Stairs/Curb  Stairs?: Yes  Stairs  # Steps : 4  Stairs Height: 6\"  Rails: Right ascending  Device: No Device  Assistance: Modified independent   Comment: reciprocal ascend and descend; difficulty with ascend- demonstrates bounce; heavy use of R UE support    Neuro Screen: Sensation  Overall Sensation Status: Impaired (neuropathy B toes and B fingers)    General AROM LE: Right WFL,Left WFL      Left Strength  Right Strength      General Strength Testing LE:  (isometric abdominal strength 3/5; trunk extensor strength 3/5)  Strength LLE  L Hip Flexion: 4-/5  L Hip Extension: 4-/5  L Hip ABduction: 2+/5  L Hip ADduction: 5/5  L Hip Internal Rotation: 3/5  L Hip External Rotation: 3/5  L Knee Flexion: 5/5  L Knee Extension: 5/5 General Strength Testing LE:  (isometric abdominal strength 3/5; trunk extensor strength 3/5)  Strength RLE  R Hip Flexion: 4+/5  R Hip Extension: 4-/5  R Hip ABduction: 3-/5 (pt compensates with hip flexion)  R Hip ADduction: 5/5  R Hip Internal Rotation: 4-/5  R Hip External Rotation: 4-/5  R Knee Flexion: 5/5  R Knee Extension: 5/5     Muscle Length/Flexibility:   Muscle Length LE  General Muscle Length - LE:  (R/L hamstring flexibility 90/90: -40 deg)    Special Tests:   Special Tests for Hip  Hip Special Tests Performed: Performed (R/L Hip: SCOUR(-), FADIR(-), NOMAN(-), ELY's(-), EDWARD's(-), MOSHE(-))    Outcomes Score:  Exam: LEFS 44/80    Treatment:  Exercises:   Exercises  Exercise 1: standing hip abd with cues to avoid ER X 5 with B UE support  Exercise 2: isometric glud medius 5 sec X 3 R/L  Exercise 3: bridge*  Exercise 4: prone hip ext*  Exercise 5: s/l hip abd/ clam/ reverse clam*  Exercise 6: hamstring stretch*  Exercise 7: step ups*  Exercise 8: 4 way hip progress to TB*  Exercise 9: TA iso*  Exercise 10: SKTC*  Exercise 11: prone trunk ext*  Exercise 12: pball DLS progression*  Exercise 20: HEP: standing hip abd, isometric glut medius     Manual:  Manual Therapy  Soft Tissue Mobilizaton: *  *Indicates exercise,modality, or manual techniques to be initiated when appropriate       ASSESSMENT     Impression: Assessment: Pt is 67 y.o. male with c/o B LE weakness and lateral hip instability which has recently worsened. Pt exhibits impairments including decreased hamstring flexibility, decreased B LE and core strength, decreased posture, decreased activity tolerance for amb and yard work. Pt requires continued PT to address these impairments, progress strength and ROM, and provide pt with HEP for self management of symptoms. Body Structures, Functions, Activity Limitations Requiring Skilled Therapeutic Intervention: Decreased functional mobility ,Decreased ROM,Decreased strength,Decreased endurance,Decreased posture    Statement of Medical Necessity: Physical Therapy is both indicated and medically necessary as outlined in the POC to increase the likelihood of meeting the functionally related goals stated below.      Patient's Activity Tolerance: Patient limited by endurance,Patient limited by fatigue      Patient's rehabilitation potential/prognosis is considered to be: Good    Factors which may impact rehabilitation potential include:  (none)     Patient Education: Nik Gray of Care,Evaluative findings,Home Exercise Program      GOALS   Patient Goal(s): Patient goals : \"walking with comfort\"    Short Term Goals Completed by 2 wks Goal Status   Pt will demonstrate improved trunk extensor, abdominal, and B LE strength >/= 4+/5 for carryover to improved tolerance for walking and yard work. New     Long Term Goals Completed by 5 wks Goal Status   Pt will demonstrate indep and compliance with % of the time for self management of LE weakness. New   Pt will demonstrate improved score on LEFS >/= 60/80 for improved pt quality of life.  New     TREATMENT PLAN       Requires PT Follow-Up: Yes    Treatment may include any combination of the following: Strengthening,ROM,Balance training,Functional mobility training,Transfer training,Endurance training,Gait training,Stair training,Neuromuscular re-education,Manual Therapy - Soft Tissue Mobilization,Pain management,Home exercise program,Safety education & training,Patient/Caregiver education & training,Equipment evaluation, education, & procurement,Modalities,Positioning,Therapeutic activities     Frequency / Duration:  Patient to be seen 2 times per week for 5 weeks  Plan Comment:    pt ok for ice          Eval Complexity:   Decision Making: Medium Complexity  History: Personal Factors and/or Comorbidities Impacting POC: High  History: spinal fusion X 2 (40 years ago and 6 years ago), high blood pressure, L TKA (2015), hernia repair, bundle branch block, HTN, OA, R torn rotator cuff  Examination of body system(s) including body structures and functions, activity limitations, and/or participation restrictions: Medium  Exam: LEFS 44/80  Clinical Presentation: Medium  Clinical Presentation: evolving    POST-PAIN     Pain Rating (0-10 pain scale):   0/10  Location and pain description same as pre-treatment unless indicated. Action: [x] NA  [] Call Physician  [] Perform HEP  [] Meds as prescribed    Evaluation and patient rights have been reviewed and patient agrees with plan of care. Yes  [x]  No  []   Explain:     Greer Fall Risk Assessment  Risk Factor Scale  Score   History of Falls [] Yes  [x] No 25  0 0   Secondary Diagnosis [] Yes  [x] No 15  0 0   Ambulatory Aid [] Furniture  [] Crutches/cane/walker  [x] None/bedrest/wheelchair/nurse 30  15  0 0   IV/Heparin Lock [] Yes  [x] No 20  0 0   Gait/Transferring [] Impaired  [] Weak  [x] Normal/bedrest/immobile 20  10  0 0   Mental Status [] Forgets limitations  [x] Oriented to own ability 15  0 0      Total:0     Based on the Assessment score: check the appropriate box.   [x]  No intervention needed   Low =   Score of 0-24  []  Use standard prevention interventions Moderate =  Score of 24-44   [] Discuss fall prevention strategies   [] Indicate moderate falls risk on eval  []  Use high risk prevention interventions High = Score of 45 and higher   [] Discuss fall prevention strategies   [] Provide supervision during treatment time    Minutes:  PT Individual Minutes  Time In: 1010  Time Out: 1100  Minutes: 50  Timed Code Treatment Minutes: 10 Minutes  Procedure Minutes: eval X 40 min     Timed Activity Minutes Units   Ther Ex 10 1     Electronically signed by Madelyn Verde PT on 5/10/22 at 12:37 PM EDT

## 2022-05-17 ENCOUNTER — HOSPITAL ENCOUNTER (OUTPATIENT)
Dept: PHYSICAL THERAPY | Age: 77
Setting detail: THERAPIES SERIES
Discharge: HOME | End: 2022-05-17
Payer: MEDICARE

## 2022-05-17 PROCEDURE — 97110 THERAPEUTIC EXERCISES: CPT

## 2022-05-17 NOTE — PROGRESS NOTES
Structures, Functions, Activity Limitations Requiring Skilled Therapeutic Intervention: Decreased functional mobility ,Decreased ROM,Decreased strength,Decreased endurance,Decreased posture  Assessment: Hip weakness displayed with inability to perform sidelying abduction. Further progressed hip and posterior chain strength program w/o c/o pain limitation. HEP handouts provided to allow progressions each session. Plan to further progress to [facility] exercises NV with continued focus on LE strength and postural endurance. Treatment Diagnosis: decreased hamstring flexibility, decreased B LE and core strength, decreased posture, decreased activity tolerance for amb and yard work  Therapy Prognosis: Good          Post-Pain Assessment:       Pain Rating (0-10 pain scale):  0 /10   Location and pain description same as pre-treatment unless indicated. Action: [x] NA   [] Perform HEP  [] Meds as prescribed  [] Modalities as prescribed   [] Call Physician     GOALS   Patient Goal(s): Patient goals : \"walking with comfort\"    Short Term Goals Completed by 2 wks Goal Status   STG 1 Pt will demonstrate improved trunk extensor, abdominal, and B LE strength >/= 4+/5 for carryover to improved tolerance for walking and yard work. In progress       Long Term Goals Completed by 5 wks Goal Status   LTG 1 Pt will demonstrate indep and compliance with % of the time for self management of LE weakness. In progress   LTG 2 Pt will demonstrate improved score on LEFS >/= 60/80 for improved pt quality of life.  In progress            Plan:  Frequency/Duration:  Plan  Plan Frequency: 2  Plan weeks: 5  Current Treatment Recommendations: Strengthening,ROM,Balance training,Functional mobility training,Transfer training,Endurance training,Gait training,Stair training,Neuromuscular re-education,Manual Therapy - Soft Tissue Mobilization,Pain management,Home exercise program,Safety education & training,Patient/Caregiver education & training,Equipment evaluation, education, & procurement,Modalities,Positioning,Therapeutic activities  Pt to continue current HEP. See objective section for any therapeutic exercise changes, additions or modifications this date.     Therapy Time:      PT Individual Minutes  Time In: 0800  Time Out: 0840  Minutes: 40  Timed Code Treatment Minutes: 38 Minutes  Procedure Minutes: 0  Timed Activity Minutes Units   Ther Ex 38 3     Electronically signed by Nikky Mcadams PT on 5/17/22 at 12:12 PM EDT

## 2022-05-19 ENCOUNTER — HOSPITAL ENCOUNTER (OUTPATIENT)
Dept: PHYSICAL THERAPY | Age: 77
Setting detail: THERAPIES SERIES
Discharge: HOME | End: 2022-05-19
Payer: MEDICARE

## 2022-05-19 PROCEDURE — 97110 THERAPEUTIC EXERCISES: CPT

## 2022-05-19 NOTE — PROGRESS NOTES
[facility]  Outpatient Physical Therapy    Treatment Note        Date: 2022  Patient: Sloan Cardona  : 1945   Confirmed: Yes  MRN: [id number]  Referring Provider: BELKYS Hong   Secondary Referring Provider (If applicable): none   Medical Diagnosis: Other symptoms and signs involving the musculoskeletal system [R29.898]  Spinal stenosis, lumbar region without neurogenic claudication [M48.061] leg weakness B, spinal stenosis of lumbar region at multiple levels  Treatment Diagnosis: decreased hamstring flexibility, decreased B LE and core strength, decreased posture, decreased activity tolerance for amb and yard work    Visit Information:  Insurance: Payor: Radha Yuen / Plan: MEDICARE PART A AND B / Product Type: *No Product type* /   PT Visit Information  Onset Date:  (gradual over last few years)  Total # of Visits Approved: 99 (BMN)  Total # of Visits to Date: 3  Plan of Care/Certification Expiration Date: 22  No Show: 0  Progress Note Due Date: 22  Canceled Appointment: 0  Progress Note Counter: 3/10 (PN due 2022)    Subjective Information:  Subjective: Pt presenting to appt reporting \"More stiffness than pain. Yesterday was worse. \"  HEP Compliance:  [x] Good [] Fair [] Poor [] Reports not doing due to:    Pain Screening  Patient Currently in Pain: Denies    Treatment:  Exercises:  Exercises  Exercise 1: Prone quad stretch and S/L ITB stretch 3x30\" b/l  Exercise 2: STS x 10; progressed to box squat 2 x 10 w/ BW  Exercise 3: bridge: 5\" x 20 w/ RTB around knees  Exercise 4: prone hip ext: 2x10 b/l  Exercise 5: s/l clam with resistance: 5\" x 15, RTB; rev clams- trialed, unable D/T  Exercise 6: seated HS stretch: 30\"x3 renae  Exercise 9: Resisted side stepping at counter, YTB 10'x3  Exercise 20: HEP:     Objective Measures:       Strength: [x] NT  [] MMT completed:     ROM: [x] NT  [] ROM measurements:      Assessment:    Body Structures, Functions, Activity Limitations Requiring Skilled Therapeutic Intervention: Decreased functional mobility ,Decreased ROM,Decreased strength,Decreased endurance,Decreased posture  Assessment: Continutation of current ex program w/ introduction to resisted side stepping to futher target lateral hip strengthening in [address]; good jayme. Trial of rev clams w/ poor ability to complete D/T ROM/strength deficits; still unable to perform sufficient S/L hip ABD. Addition of ITB and quad stretches to address muscle tightness observed during tx, inc relief of stiffness. HEP provided. Treatment Diagnosis: decreased hamstring flexibility, decreased B LE and core strength, decreased posture, decreased activity tolerance for amb and yard work  Therapy Prognosis: Good     Post-Pain Assessment:       Pain Rating (0-10 pain scale):   0/10   Location and pain description same as pre-treatment unless indicated. Action: [x] NA   [] Perform HEP  [] Meds as prescribed  [] Modalities as prescribed   [] Call Physician     GOALS   Patient Goal(s): Patient goals : \"walking with comfort\"    Short Term Goals Completed by 2 wks Goal Status   STG 1 Pt will demonstrate improved trunk extensor, abdominal, and B LE strength >/= 4+/5 for carryover to improved tolerance for walking and yard work. In progress     Long Term Goals Completed by 5 wks Goal Status   LTG 1 Pt will demonstrate indep and compliance with % of the time for self management of LE weakness. In progress   LTG 2 Pt will demonstrate improved score on LEFS >/= 60/80 for improved pt quality of life.  In progress   Plan:  Frequency/Duration:  Plan  Plan Frequency: 2  Plan weeks: 5  Current Treatment Recommendations: Strengthening,ROM,Balance training,Functional mobility training,Transfer training,Endurance training,Gait training,Stair training,Neuromuscular re-education,Manual Therapy - Soft Tissue Mobilization,Pain management,Home exercise program,Safety education & training,Patient/Caregiver education & training,Equipment evaluation, education, & procurement,Modalities,Positioning,Therapeutic activities  Pt to continue current HEP. See objective section for any therapeutic exercise changes, additions or modifications this date.     Therapy Time:      PT Individual Minutes  Time In: 0802  Time Out: 0840  Minutes: 38  Timed Code Treatment Minutes: 38 Minutes  Procedure Minutes: N/A   Timed Activity Minutes Units   Ther Ex 38 3     Electronically signed by Vu Sanches PTA on 5/19/22 at 9:00 AM EDT

## 2022-05-24 ENCOUNTER — HOSPITAL ENCOUNTER (OUTPATIENT)
Dept: PHYSICAL THERAPY | Age: 77
Setting detail: THERAPIES SERIES
Discharge: HOME | End: 2022-05-24
Payer: MEDICARE

## 2022-05-24 PROCEDURE — 97110 THERAPEUTIC EXERCISES: CPT

## 2022-05-24 NOTE — PROGRESS NOTES
[facility]  Outpatient Physical Therapy    Treatment Note        Date: 2022  Patient: Lacretia Jeans  : 1945   Confirmed: Yes  MRN: [id number]  Referring Provider: BELKYS Billy   Secondary Referring Provider (If applicable): none   Medical Diagnosis: Other symptoms and signs involving the musculoskeletal system [R29.898]  Spinal stenosis, lumbar region without neurogenic claudication [M48.061]    Treatment Diagnosis: decreased hamstring flexibility, decreased B LE and core strength, decreased posture, decreased activity tolerance for amb and yard work    Visit Information:  Insurance: Payor: María Fischerg / Plan: MEDICARE PART A AND B / Product Type: *No Product type* /   PT Visit Information  Onset Date:  (gradual over last few years)  Total # of Visits Approved: 99 (BMN)  Total # of Visits to Date: 4  Plan of Care/Certification Expiration Date: 22  No Show: 0  Progress Note Due Date: 22  Canceled Appointment: 0  Progress Note Counter: 4/10 (PN due 2022)    Subjective Information:  Subjective: \"stiffness in low back\"  Pt states completes HEP 5 days a week- does not complete HEP on days he golfs\"  \"I feel better when I get done with the exercises\" \"I think I am getting some decreased snapping\"  HEP Compliance:  [x] Good [] Fair [] Poor [] Reports not doing due to:    Treatment:  Exercises:  Exercises  Exercise 1: Prone quad stretch and S/L ITB stretch 3x30\" b/l  Exercise 3: bridge: 5\" x 20 w/ RTB around knees  Exercise 4: prone hip ext: 2x10 b/l- compensatory motions exhibited with tactile cues to correct; hip ext with knee flexion X 10 b/l  Exercise 5: s/l clam with resistance X 15 with cues for decreased speed of eccentric phase  Exercise 6: supine HS stretch with strap 20 sec X 3 b/l  Exercise 9: stepping over maine with cues to avoid compensatory motions- extreme difficulty without UE support;  Resisted side stepping at counter*  Exercise 10: SKTC 30 sec X 3 R/L  Exercise 12: side plank on knees X 10 b/l  Exercise 20: HEP: ITB with strap; side plank on knees  *Indicates exercise, modality, or manual techniques to be initiated when appropriate    Objective Measures:   Strength: [] NT  [x] MMT completed:  Strength RLE  R Hip ABduction: 3/5  Strength LLE  L Hip ABduction: 3-/5    ROM: [x] NT  [] ROM measurements:    Assessment: Body Structures, Functions, Activity Limitations Requiring Skilled Therapeutic Intervention: Decreased functional mobility ,Decreased ROM,Decreased strength,Decreased endurance,Decreased posture  Assessment: Tx session continued with strengthening of lateral hips for decreased incidence of \"snapping\" of ITB. No c/o pain during session. Pt continues to exhibit weakness of B lateral hips/ glut medius requiring continued training to progress to normalized gait pattern. Treatment Diagnosis: decreased hamstring flexibility, decreased B LE and core strength, decreased posture, decreased activity tolerance for amb and yard work  Therapy Prognosis: Good    Post-Pain Assessment:       Pain Rating (0-10 pain scale):  3 /10   Location and pain description same as pre-treatment unless indicated. Action: [] NA   [x] Perform HEP  [] Meds as prescribed  [] Modalities as prescribed   [] Call Physician     GOALS   Patient Goal(s): Patient goals : \"walking with comfort\"    Short Term Goals Completed by 2 wks Goal Status   STG 1 Pt will demonstrate improved trunk extensor, abdominal, and B LE strength >/= 4+/5 for carryover to improved tolerance for walking and yard work. In progress       Long Term Goals Completed by 5 wks Goal Status   LTG 1 Pt will demonstrate indep and compliance with % of the time for self management of LE weakness. In progress   LTG 2 Pt will demonstrate improved score on LEFS >/= 60/80 for improved pt quality of life.  In progress     Plan:  Frequency/Duration:  Plan  Plan Frequency: 2  Plan weeks: 5  Current Treatment Recommendations: Strengthening,ROM,Balance training,Functional mobility training,Transfer training,Endurance training,Gait training,Stair training,Neuromuscular re-education,Manual Therapy - Soft Tissue Mobilization,Pain management,Home exercise program,Safety education & training,Patient/Caregiver education & training,Equipment evaluation, education, & procurement,Modalities,Positioning,Therapeutic activities  Pt to continue current HEP. See objective section for any therapeutic exercise changes, additions or modifications this date.     Therapy Time:   PT Individual Minutes  Time In: 0802  Time Out: 5916  Minutes: 39  Timed Code Treatment Minutes: 39 Minutes  Procedure Minutes: 0 min    Timed Activity Minutes Units   Ther Ex 39 3     Electronically signed by Jennifer Fish PT on 5/24/22 at 8:46 AM EDT

## 2022-05-26 ENCOUNTER — HOSPITAL ENCOUNTER (OUTPATIENT)
Dept: PHYSICAL THERAPY | Age: 77
Setting detail: THERAPIES SERIES
Discharge: HOME | End: 2022-05-26
Payer: MEDICARE

## 2022-05-26 PROCEDURE — 97110 THERAPEUTIC EXERCISES: CPT

## 2022-05-26 NOTE — PROGRESS NOTES
[facility]  Outpatient Physical Therapy    Treatment Note        Date: 2022  Patient: Latisha Heart  : 1945   Confirmed: Yes  MRN: [id number]  Referring Provider: BELKYS Olivarez   Secondary Referring Provider (If applicable): none   Medical Diagnosis: Other symptoms and signs involving the musculoskeletal system [R29.898]  Spinal stenosis, lumbar region without neurogenic claudication [M48.061]    Treatment Diagnosis: decreased hamstring flexibility, decreased B LE and core strength, decreased posture, decreased activity tolerance for amb and yard work    Visit Information:  Insurance: Payor: Sol Pickard / Plan: MEDICARE PART A AND B / Product Type: *No Product type* /   PT Visit Information  Onset Date:  (gradual over last few years)  Total # of Visits Approved: 99 (BMN)  Total # of Visits to Date: 5  Plan of Care/Certification Expiration Date: 22  No Show: 0  Progress Note Due Date: 22  Canceled Appointment: 0  Progress Note Counter: 5/10 (PN due 2022)    Subjective Information:  Subjective: Pt states \"I think it's more of a stiffness in the LB versus a pain since I've been doing all these different exs. \"  HEP Compliance:  [x] Good [] Fair [] Poor [] Reports not doing due to:    Pain Screening  Patient Currently in Pain: Denies    Treatment:  Exercises:  Exercises  Exercise 1: Prone quad stretch and S/L ITB stretch 3x30\" b/l  Exercise 2: Prone quad stretch 3x30\" b/l  Exercise 3: bridge: 5\" x 20 w/ RTB around knees  Exercise 4: Mid back stretch w/ Pball; seated 10\"x5 (3 way)  Exercise 6: supine HS stretch with strap 20 sec X 3 b/l  Exercise 7: Hip hikes 2x10 on 4\" box; 6\" step up w/ contralateral high knee x10 b/l (UE support)  Exercise 8: 4 way hip progress to TB x10 ea YTB  Exercise 11: prone trunk ext- trialed w/ inc neck strain  Exercise 20: HEP: hip hikes, TB 4 way hip     Objective Measures:      Strength: [x] NT  [] MMT completed:     ROM: [x] NT  [] ROM measurements:     Assessment: Body Structures, Functions, Activity Limitations Requiring Skilled Therapeutic Intervention: Decreased functional mobility ,Decreased ROM,Decreased strength,Decreased endurance,Decreased posture  Assessment: Cont'd to address hip strengthening w/ addition of hip hikes, 4 way hip w/ TB and runner steps today. Pt completing session w/ reports of good jayme w/ appropriate fatigue as well as decreased overall \"stiffness\" post ex actiivty. HEP provided of newly added exs. Treatment Diagnosis: decreased hamstring flexibility, decreased B LE and core strength, decreased posture, decreased activity tolerance for amb and yard work  Therapy Prognosis: Good      Post-Pain Assessment:       Pain Rating (0-10 pain scale):   0/10   Location and pain description same as pre-treatment unless indicated. Action: [x] NA   [] Perform HEP  [] Meds as prescribed  [] Modalities as prescribed   [] Call Physician     GOALS   Patient Goal(s): Patient goals : \"walking with comfort\"    Short Term Goals Completed by 2 wks Goal Status   STG 1 Pt will demonstrate improved trunk extensor, abdominal, and B LE strength >/= 4+/5 for carryover to improved tolerance for walking and yard work. In progress       Long Term Goals Completed by 5 wks Goal Status   LTG 1 Pt will demonstrate indep and compliance with % of the time for self management of LE weakness. In progress   LTG 2 Pt will demonstrate improved score on LEFS >/= 60/80 for improved pt quality of life.  In progress     Plan:  Frequency/Duration:  Plan  Plan Frequency: 2  Plan weeks: 5  Current Treatment Recommendations: Strengthening,ROM,Balance training,Functional mobility training,Transfer training,Endurance training,Gait training,Stair training,Neuromuscular re-education,Manual Therapy - Soft Tissue Mobilization,Pain management,Home exercise program,Safety education & training,Patient/Caregiver education & training,Equipment evaluation, education, & procurement,Modalities,Positioning,Therapeutic activities  Pt to continue current HEP. See objective section for any therapeutic exercise changes, additions or modifications this date.     Therapy Time:      PT Individual Minutes  Time In: 8762  Time Out: 0983  Minutes: 38  Timed Code Treatment Minutes: 38 Minutes  Procedure Minutes: N/A   Timed Activity Minutes Units   Ther Ex 38 3     Electronically signed by Nader Landeros PTA on 5/26/22 at 9:58 AM EDT

## 2022-05-31 ENCOUNTER — HOSPITAL ENCOUNTER (OUTPATIENT)
Dept: PHYSICAL THERAPY | Age: 77
Setting detail: THERAPIES SERIES
Discharge: HOME | End: 2022-05-31
Payer: MEDICARE

## 2022-05-31 PROCEDURE — 97110 THERAPEUTIC EXERCISES: CPT

## 2022-05-31 NOTE — PROGRESS NOTES
[facility]  Outpatient Physical Therapy    Treatment Note        Date: 2022  Patient: Enmanuel Tidwell  : 1945   Confirmed: Yes  MRN: [id number]  Referring Provider: BELKYS Gordno   Secondary Referring Provider (If applicable): none   Medical Diagnosis: Other symptoms and signs involving the musculoskeletal system [R29.898]  Spinal stenosis, lumbar region without neurogenic claudication [M48.061]    Treatment Diagnosis: decreased hamstring flexibility, decreased B LE and core strength, decreased posture, decreased activity tolerance for amb and yard work    Visit Information:  Insurance: Payor: Rebekah Foreman / Plan: MEDICARE PART A AND B / Product Type: *No Product type* /   PT Visit Information  Onset Date:  (gradual over last few years)  Total # of Visits Approved: 99 (BMN)  Total # of Visits to Date: 6  Plan of Care/Certification Expiration Date: 22  No Show: 0  Progress Note Due Date: 22  Canceled Appointment: 0  Progress Note Counter: 6/10 (PN due 2022)    Subjective Information:  Subjective: No pain at arrival. Pt states \"It's not so much the pain that's an issue, it's more so the strengthening. \"  HEP Compliance:  [x] Good [] Fair [] Poor [] Reports not doing due to:    Pain Screening  Patient Currently in Pain: Denies    Treatment:  Exercises:  Exercises  Exercise 1: Prone quad stretch and S/L ITB stretch 3x30\" b/l  Exercise 2: Prone quad stretch 3x30\" b/l  Exercise 3: bridge: 5\" x 20 w/ RTB around knees  Exercise 4: Mid back stretch w/ Pball; seated 10\"x5 (3 way)  Exercise 5: Rows/lats w/ GTB 2x15 ea  Exercise 6: supine HS stretch with strap 20 sec X 3 b/l  Exercise 7: Hip hikes 2x10 on 4\" box; 6\" step up w/ contralateral high knee x10 b/l (UE support)  Exercise 8: 4 way hip progress to TB x10 ea YTB  Exercise 11: prone trunk ext- trialed w/ inc neck strain  Exercise 20: HEP: rows/lats w/ GTB, Pball marches     *Indicates exercise, modality, or manual techniques to training,Gait training,Stair training,Neuromuscular re-education,Manual Therapy - Soft Tissue Mobilization,Pain management,Home exercise program,Safety education & training,Patient/Caregiver education & training,Equipment evaluation, education, & procurement,Modalities,Positioning,Therapeutic activities  Pt to continue current HEP. See objective section for any therapeutic exercise changes, additions or modifications this date.     Therapy Time:      PT Individual Minutes  Time In: 0801  Time Out: 0840  Minutes: 39  Timed Code Treatment Minutes: 39 Minutes  Procedure Minutes: N/A   Timed Activity Minutes Units   Ther Ex 39 3   Electronically signed by Jenelle Irizarry PTA on 5/31/22 at 8:55 AM EDT

## 2022-06-02 ENCOUNTER — APPOINTMENT (OUTPATIENT)
Dept: PHYSICAL THERAPY | Age: 77
End: 2022-06-02
Payer: MEDICARE

## 2022-06-07 ENCOUNTER — HOSPITAL ENCOUNTER (OUTPATIENT)
Dept: PHYSICAL THERAPY | Age: 77
Setting detail: THERAPIES SERIES
Discharge: HOME | End: 2022-06-07
Payer: MEDICARE

## 2022-06-07 PROCEDURE — 97110 THERAPEUTIC EXERCISES: CPT

## 2022-06-07 NOTE — PROGRESS NOTES
[facility]  Outpatient Physical Therapy    Treatment Note        Date: 2022  Patient: Alaina Lundberg  : 1945   Confirmed: Yes  MRN: [id number]  Referring Provider: BELKYS Park   Secondary Referring Provider (If applicable): none   Medical Diagnosis: Other symptoms and signs involving the musculoskeletal system [R29.898]  Spinal stenosis, lumbar region without neurogenic claudication [M48.061]    Treatment Diagnosis: decreased hamstring flexibility, decreased B LE and core strength, decreased posture, decreased activity tolerance for amb and yard work    Visit Information:  Insurance: Payor: Otis Recio / Plan: MEDICARE PART A AND B / Product Type: *No Product type* /   PT Visit Information  Onset Date:  (gradual over last few years)  Total # of Visits Approved: 99 (BMN)  Total # of Visits to Date: 7  Plan of Care/Certification Expiration Date: 22  No Show: 0  Progress Note Due Date: 22  Canceled Appointment: 0  Progress Note Counter: 7/10 (PN due 2022)    Subjective Information:  Subjective: Pt states feels comfortable with currently issued HEP. Agreeable to DC.   HEP Compliance:  [x] Good [] Fair [] Poor [] Reports not doing due to:    Pain Screening  Patient Currently in Pain: Denies    Treatment:  Exercises:  Exercises  Exercise 2: Prone quad stretch 3x30\" b/l, Mod eileen stretch with improved technique  Exercise 3: bridge: 5\" x 20 w/ GTB around knees  Exercise 5: Rows/lats w/ GTB 2x15 ea  Exercise 6: supine HS stretch with strap 20 sec X 3 b/l  Exercise 7: Hip hikes 2x10 on 4\" box; 6\" step up w/ contralateral high knee x15 b/l (UE support)  Exercise 20: HEP: GTB to progress bridges    *Indicates exercise, modality, or manual techniques to be initiated when appropriate    Objective Measures:     Strength: [] NT  [x] MMT completed:  Strength RLE  R Hip Flexion: 4+/5  R Hip Extension: 4-/5  R Hip ABduction: 3+/5  R Hip ADduction: 5/5  R Hip Internal Rotation: 4+/5  R Hip External Rotation: 4-/5  R Knee Flexion: 5/5  R Knee Extension: 5/5     Strength LLE  L Hip Flexion: 4/5  L Hip Extension: 4-/5  L Hip ABduction: 3+/5  L Hip ADduction: 5/5  L Hip Internal Rotation: 4+/5  L Hip External Rotation: 4-/5  L Knee Flexion: 5/5  L Knee Extension: 5/5      Assessment: Body Structures, Functions, Activity Limitations Requiring Skilled Therapeutic Intervention: Decreased functional mobility ,Decreased ROM,Decreased strength,Decreased endurance,Decreased posture  Assessment: Pt demonstrates improved LE strength vs evaluation. Improved tolerance to functional activities noted via LEFS. Pt demonstrates good knowledge of HEP for slef progression. Pt to be discharged from therapy at this time. Treatment Diagnosis: decreased hamstring flexibility, decreased B LE and core strength, decreased posture, decreased activity tolerance for amb and yard work  Therapy Prognosis: Good          Post-Pain Assessment:       Pain Rating (0-10 pain scale):  0 /10   Location and pain description same as pre-treatment unless indicated. Action: [] NA   [x] Perform HEP  [] Meds as prescribed  [] Modalities as prescribed   [] Call Physician     GOALS   Patient Goal(s): Patient goals : \"walking with comfort\"    Short Term Goals Completed by 2 wks Goal Status   STG 1 Pt will demonstrate improved trunk extensor, abdominal, and B LE strength >/= 4+/5 for carryover to improved tolerance for walking and yard work. In progress       Long Term Goals Completed by 5 wks Goal Status   LTG 1 Pt will demonstrate indep and compliance with % of the time for self management of LE weakness. In progress   LTG 2 Pt will demonstrate improved score on LEFS >/= 60/80 for improved pt quality of life. In progress            Plan:  Frequency/Duration:  Plan  Plan Comment: Discharge PT  Pt to continue current HEP. See objective section for any therapeutic exercise changes, additions or modifications this date.     Therapy Time: PT Individual Minutes  Time In: 2989  Time Out: 0549  Minutes: 39  Timed Code Treatment Minutes: 39 Minutes  Procedure Minutes: 0    Timed Activity Minutes Units   Ther Ex 39 3     Electronically signed by Amanda Bull PTA on 6/7/22 at 11:26 AM EDT

## 2022-06-07 NOTE — PROGRESS NOTES
Abraham m[address]onnell [address]     Ph: [phone]  Fax: [phone]      [] Certification  [] Recertification []  Plan of Care  [] Progress Note [x] Discharge      Referring Provider: BELKYS Licona  Referring Provider (secondary): none   From:  Nicolasa Sepulveda, PT   Patient: Carmenza Runner (73 y.o. male) : 1945 Date: 2022   Medical Diagnosis: Other symptoms and signs involving the musculoskeletal system [R29.898]  Spinal stenosis, lumbar region without neurogenic claudication [M48.061]    Treatment Diagnosis: decreased hamstring flexibility, decreased B LE and core strength, decreased posture, decreased activity tolerance for amb and yard work    Plan of Care/Certification Expiration Date: : 22   Progress Report Period from:  5/10/2022  to 2022    Visits to Date: 7 No Show: 0 Cancelled Appts: 0    OBJECTIVE:   Short Term Goals - Time Frame for Short term goals: 2 wks    Goals Current/Discharge status  Status   Short term goal 1: Pt will demonstrate improved trunk extensor, abdominal, and B LE strength >/= 4+/5 for carryover to improved tolerance for walking and yard work. Strength LLE  L Hip Flexion: 4/5  L Hip Extension: 4-/5  L Hip ABduction: 3+/5  L Hip ADduction: 5/5  L Hip Internal Rotation: 4+/5  L Hip External Rotation: 4-/5  L Knee Flexion: 5/5  L Knee Extension: 5/5     Strength RLE  R Hip Flexion: 4+/5  R Hip Extension: 4-/5  R Hip ABduction: 3+/5  R Hip ADduction: 5/5  R Hip Internal Rotation: 4+/5  R Hip External Rotation: 4-/5  R Knee Flexion: 5/5  R Knee Extension: 5/5    In progress     Long Term Goals - Time Frame for Long term goals : 5 wks  Goals Current/ Discharge status Status   Long term goal 1: Pt will demonstrate indep and compliance with % of the time for self management of LE weakness. Pt reports compliance with HEP with good technique noted during session.  In progress   Long term goal 2: Pt will demonstrate improved score on LEFS >/= 60/80 for improved pt quality of life. 55/80 In progress       Body Structures, Functions, Activity Limitations Requiring Skilled Therapeutic Intervention: Decreased functional mobility ,Decreased ROM,Decreased strength,Decreased endurance,Decreased posture  Assessment: Pt demonstrates improved LE strength vs evaluation. Improved tolerance to functional activities noted via LEFS. Pt demonstrates good knowledge of HEP for slef progression. Pt to be discharged from therapy at this time due to appropriate progress toward d/c and pt able to complete HEP indep. Therapy Prognosis: Good    PLAN: [x]   Frequency/Duration:  Plan Comment: Discharge PT             Patient Status:[] Continue/ Initiate plan of Care    [x] Discharge PT. Recommend pt continue with HEP. [] Additional visits requested, Please re-certify for additional visits:    [] Hold         Signature: Objective information by: Electronically signed by Twyla Etienne PTA on 6/7/22 at 9:26 AM EDT  Electronically signed by Jeannette Mayes PT on 6/14/2022 at 1:48 PM        If you have any questions or concerns, please don't hesitate to call. Thank you for your referral.    I have reviewed this plan of care and certify a need for medically necessary rehabilitation services.     Physician Signature:__________________________________________________________  Date:  Please sign and return

## 2022-06-14 ENCOUNTER — APPOINTMENT (OUTPATIENT)
Dept: PHYSICAL THERAPY | Age: 77
End: 2022-06-14
Payer: MEDICARE

## 2023-09-14 PROBLEM — Z98.890 HISTORY OF BACK SURGERY: Status: ACTIVE | Noted: 2023-09-14

## 2023-09-14 PROBLEM — Z98.890 HISTORY OF HERNIA REPAIR: Status: ACTIVE | Noted: 2023-09-14

## 2023-09-14 PROBLEM — I42.8 NON-ISCHEMIC CARDIOMYOPATHY (MULTI): Status: ACTIVE | Noted: 2023-09-14

## 2023-09-14 PROBLEM — I49.3 PVC (PREMATURE VENTRICULAR CONTRACTION): Status: ACTIVE | Noted: 2023-09-14

## 2023-09-14 PROBLEM — Z95.818 STATUS POST PLACEMENT OF IMPLANTABLE LOOP RECORDER: Status: ACTIVE | Noted: 2023-09-14

## 2023-09-14 PROBLEM — Z87.19 HISTORY OF HERNIA REPAIR: Status: ACTIVE | Noted: 2023-09-14

## 2023-09-14 PROBLEM — R93.89 ABNORMAL MRI: Status: ACTIVE | Noted: 2023-09-14

## 2023-09-14 PROBLEM — R94.31 ABNORMAL EKG: Status: ACTIVE | Noted: 2023-09-14

## 2023-09-14 PROBLEM — Z87.891 FORMER SMOKER: Status: ACTIVE | Noted: 2023-09-14

## 2023-09-14 PROBLEM — E78.5 HYPERLIPIDEMIA: Status: ACTIVE | Noted: 2023-09-14

## 2023-09-14 PROBLEM — I10 ESSENTIAL HYPERTENSION: Status: ACTIVE | Noted: 2023-09-14

## 2023-09-14 PROBLEM — Z96.659 HISTORY OF KNEE REPLACEMENT: Status: ACTIVE | Noted: 2023-09-14

## 2023-09-14 PROBLEM — E66.3 OVERWEIGHT WITH BODY MASS INDEX (BMI) OF 29 TO 29.9 IN ADULT: Status: ACTIVE | Noted: 2023-09-14

## 2023-09-14 PROBLEM — I44.7 LEFT BUNDLE BRANCH BLOCK (LBBB): Status: ACTIVE | Noted: 2023-09-14

## 2023-09-14 PROBLEM — R00.0 SINUS TACHYCARDIA: Status: ACTIVE | Noted: 2023-09-14

## 2023-09-14 PROBLEM — N52.9 INABILITY TO MAINTAIN ERECTION: Status: ACTIVE | Noted: 2023-09-14

## 2023-09-14 RX ORDER — LANOLIN ALCOHOL/MO/W.PET/CERES
0.5 CREAM (GRAM) TOPICAL DAILY
COMMUNITY

## 2023-09-14 RX ORDER — SACUBITRIL AND VALSARTAN 49; 51 MG/1; MG/1
1 TABLET, FILM COATED ORAL 2 TIMES DAILY
COMMUNITY
End: 2023-10-27 | Stop reason: DRUGHIGH

## 2023-09-14 RX ORDER — PRAVASTATIN SODIUM 10 MG/1
1 TABLET ORAL NIGHTLY
COMMUNITY
End: 2023-10-27 | Stop reason: SDUPTHER

## 2023-09-14 RX ORDER — ASPIRIN 81 MG/1
1 TABLET ORAL DAILY
Status: ON HOLD | COMMUNITY
End: 2023-11-15 | Stop reason: SDUPTHER

## 2023-09-14 RX ORDER — CHOLECALCIFEROL (VITAMIN D3) 50 MCG
1 TABLET ORAL DAILY
COMMUNITY

## 2023-10-17 ENCOUNTER — OFFICE VISIT (OUTPATIENT)
Dept: CARDIOLOGY | Facility: CLINIC | Age: 78
End: 2023-10-17
Payer: MEDICARE

## 2023-10-17 VITALS
HEIGHT: 72 IN | DIASTOLIC BLOOD PRESSURE: 70 MMHG | HEART RATE: 88 BPM | WEIGHT: 200 LBS | BODY MASS INDEX: 27.09 KG/M2 | SYSTOLIC BLOOD PRESSURE: 124 MMHG

## 2023-10-17 DIAGNOSIS — I44.7 LEFT BUNDLE BRANCH BLOCK (LBBB): ICD-10-CM

## 2023-10-17 DIAGNOSIS — Z45.09 ENCOUNTER FOR LOOP RECORDER AT END OF BATTERY LIFE: ICD-10-CM

## 2023-10-17 DIAGNOSIS — Z95.818 STATUS POST PLACEMENT OF IMPLANTABLE LOOP RECORDER: ICD-10-CM

## 2023-10-17 DIAGNOSIS — E78.2 MIXED HYPERLIPIDEMIA: ICD-10-CM

## 2023-10-17 DIAGNOSIS — Z71.89 ENCOUNTER FOR MEDICATION REVIEW AND COUNSELING: ICD-10-CM

## 2023-10-17 DIAGNOSIS — I49.3 PVC (PREMATURE VENTRICULAR CONTRACTION): ICD-10-CM

## 2023-10-17 DIAGNOSIS — Z87.891 FORMER SMOKER: ICD-10-CM

## 2023-10-17 DIAGNOSIS — R00.0 SINUS TACHYCARDIA: Primary | ICD-10-CM

## 2023-10-17 DIAGNOSIS — Z71.2 ENCOUNTER TO DISCUSS TEST RESULTS: ICD-10-CM

## 2023-10-17 DIAGNOSIS — I42.8 NON-ISCHEMIC CARDIOMYOPATHY (MULTI): ICD-10-CM

## 2023-10-17 PROCEDURE — 99215 OFFICE O/P EST HI 40 MIN: CPT | Performed by: INTERNAL MEDICINE

## 2023-10-17 PROCEDURE — 93000 ELECTROCARDIOGRAM COMPLETE: CPT | Performed by: INTERNAL MEDICINE

## 2023-10-17 PROCEDURE — 3078F DIAST BP <80 MM HG: CPT | Performed by: INTERNAL MEDICINE

## 2023-10-17 PROCEDURE — 3074F SYST BP LT 130 MM HG: CPT | Performed by: INTERNAL MEDICINE

## 2023-10-17 PROCEDURE — 1036F TOBACCO NON-USER: CPT | Performed by: INTERNAL MEDICINE

## 2023-10-17 PROCEDURE — 1159F MED LIST DOCD IN RCRD: CPT | Performed by: INTERNAL MEDICINE

## 2023-10-17 NOTE — PROGRESS NOTES
Chief Complaint:   Device at [facility]     History Of Present Illness:    Horace Klein is a 78 y.o. male presenting with followup.  He is accompanied by his wife.    He feels the same.  He denies any arrhythmia symptoms. He denies any lightheadedness, near syncope, or syncope.     Last Recorded Vitals:  Vitals:    10/17/23 1203   BP: 124/70   BP Location: Right arm   Pulse: 88   Weight: 90.7 kg (200 lb)   Height: 1.829 m (6')       Past Medical History:  He has a past medical history of Disorder of carbohydrate metabolism, unspecified (CMS/HCC), Encounter for preprocedural cardiovascular examination, Other specified abnormal findings of blood chemistry, Personal history of other diseases of the digestive system, Personal history of other endocrine, nutritional and metabolic disease, and Persons encountering health services in other specified circumstances.    Past Surgical History:  He has a past surgical history that includes Other surgical history (10/29/2021); Other surgical history (10/27/2021); Other surgical history (10/27/2021); Other surgical history (10/28/2022); Other surgical history (10/28/2022); and Other surgical history (10/28/2022).      Social History:  He reports that he has quit smoking. His smoking use included cigarettes. He has never used smokeless tobacco. He reports current alcohol use. He reports that he does not use drugs.    Family History:  Family History   Problem Relation Name Age of Onset    Other (heart valve replaced) Brother          Allergies:  Patient has no known allergies.    Outpatient Medications:  Current Outpatient Medications   Medication Instructions    aspirin 81 mg EC tablet 1 tablet, oral, Daily    cholecalciferol (Vitamin D-3) 50 MCG (2000 UT) tablet 1 tablet, oral, Daily    magnesium oxide (Mag-Ox) 400 mg (241.3 mg magnesium) tablet 0.5 tablets, oral, Daily    multivit-min/ferrous fumarate (MULTI VITAMIN ORAL) 1 tablet, oral, Daily    pravastatin (Pravachol) 10 mg tablet  "1 tablet, oral, Nightly    sacubitriL-valsartan (Entresto) 49-51 mg tablet 1 tablet, oral, 2 times daily     Constitutional: not feeling tired.   Cardiovascular: no intermittent leg claudication and as noted in HPI.   Respiratory: no cough and no shortness of breath.   Gastrointestinal: no change in bowel habits and no blood in stools.   Integumentary: no skin rashes.   Neurological: no seizures and no frequent falls.   All other systems have been reviewed and are negative for complaint.      Physical Exam:  Constitutional:  overweight.   Neck: neck is supple, symmetric, trachea midline, no masses  and no thyromegaly .   Pulmonary: no increased work of breathing or signs of respiratory distress  and lungs clear to auscultation.    Cardiovascular: carotid pulses 2+ bilaterally with no bruit , JVP was normal, no thrills , regular rhythm, normal S1 and S2, no murmurs , pedal pulses 2+ bilaterally  and no edema .   Abdomen: abdomen non-tender, no masses  and no hepatomegaly .   Skin: skin warm and dry, normal skin turgor .   Psychiatric judgment and insight is normal  and oriented to person, place and time .      Last Labs:  CBC -  Lab Results   Component Value Date    WBC 5.9 05/17/2021    HGB 14.8 05/17/2021    HCT 44.1 05/17/2021     (H) 05/17/2021     05/17/2021       CMP -  Lab Results   Component Value Date    CALCIUM 8.9 05/17/2021    PROT 6.6 05/17/2021    ALBUMIN 4.2 05/17/2021    AST 27 05/17/2021    ALT 20 05/17/2021    ALKPHOS 65 05/17/2021    BILITOT 1.1 05/17/2021       LIPID PANEL -   No results found for: \"CHOL\", \"TRIG\", \"HDL\", \"CHHDL\", \"LDLF\", \"VLDL\", \"NHDL\"    RENAL FUNCTION PANEL -   Lab Results   Component Value Date    GLUCOSE 98 05/17/2021     05/17/2021    K 4.0 05/17/2021     05/17/2021    CO2 26 05/17/2021    ANIONGAP 12 05/17/2021    BUN 17 05/17/2021    CREATININE 0.76 05/17/2021    CALCIUM 8.9 05/17/2021    ALBUMIN 4.2 05/17/2021        Lab Results   Component Value " "Date    HGBA1C 5.4 09/14/2021       Last Cardiology Tests:  Device Check: Aug 2023. [facility] DM 3500 confirm loop recorder. At end of service.      See signed ECG and check /Paceart.    ECG:  ECG Today. Normal sinus rhythm. Right axis deviation. QTc 460. LBBB. Isolated PVCs  Echo:  No results found for this or any previous visit from the past 1095 days.      Ejection Fractions:  No results found for: \"EF\"    Cath:  No results found for this or any previous visit from the past 1095 days.      Stress Test:  No results found for this or any previous visit from the past 1095 days.      Cardiac Imaging:  MR CARDIAC RESONANCE IMAGING FOR VELOCITY FLOW MAPPING 2/13/2023        Lab review: I have personally reviewed the laboratory result(s) see above    Assessment/Plan   Problem List Items Addressed This Visit             ICD-10-CM       Cardiac and Vasculature    Status post placement of implantable loop recorder Z95.818    Hyperlipidemia E78.5    Left bundle branch block (LBBB) I44.7    Relevant Orders    ECG 12 lead (Ancillary Performed)    Non-ischemic cardiomyopathy (CMS/HCC) I42.8    PVC (premature ventricular contraction) I49.3    Sinus tachycardia R00.0    Encounter for loop recorder at end of battery life Z45.09    Relevant Orders    Case Request EP Lab: Loop Recorder Explant (Completed)       Endocrine/Metabolic    BMI 27.0-27.9,adult Z68.27       Health Encounters    Encounter to discuss test results Z71.2    Encounter for medication review and counseling Z71.89       Tobacco    Former smoker Z87.891       Imp / Plan  Nonischemic cardiomyopathy, chronic. Stable. Ejection fraction 40% by echocardiogram 2020 after optimal heart failure medications. [place] [employer] 2B heart failure. Chronic systolic heart failure. Reviewed meds. Continue meds. Refills discussed.  EP study May 2021. Normal SA giuliano function. Mild AV giuliano function. Normal His function. No VA conduction. As no inducible VT, loop recorder " implanted.  [facility] DM 3500 loop recorder. Reviewed device check. At end of service. Preop cardiac evaluation performed. Shared decision making. E Consent signed. After loop explant, then 1 week wound check, then yearly EP OV.  Hypertension. Stable. Chronic. Reviewed medications. Continue medications. Refills  Recurrent PVCs. Stable. Chronic. Reviewed medications. Continue medications. Status post large ventral hernia repair  Status post left knee surgery  Status post back surgery  Former smoker  Overweight       AHA recommendations for exercise, diet, and behavioral modification reviewed with pt.    The patient and I and wife discussed the mechanism of arrhythmia, preop eval for loop removal, once loop removed then no internal rhythm monitoring is present, what are indications for loop vs pacer, no implant of new device at time of loop explant given currentr asymptomatic state, indications for and types of medications, discussion if and what medication refills needed, treatment options, risks, benefits, and imponderables. American Heart Association lifestyle changes and behavioral modification discussed. All questions answered in detail. Counseling over 50% visit regarding above. Patient appreciative of care.  Annie Montenegro MD

## 2023-10-27 ENCOUNTER — OFFICE VISIT (OUTPATIENT)
Dept: CARDIOLOGY | Facility: CLINIC | Age: 78
End: 2023-10-27
Payer: MEDICARE

## 2023-10-27 VITALS
DIASTOLIC BLOOD PRESSURE: 49 MMHG | HEART RATE: 80 BPM | BODY MASS INDEX: 26.85 KG/M2 | WEIGHT: 198 LBS | SYSTOLIC BLOOD PRESSURE: 117 MMHG

## 2023-10-27 DIAGNOSIS — R94.31 ABNORMAL EKG: ICD-10-CM

## 2023-10-27 DIAGNOSIS — Z45.09 ENCOUNTER FOR LOOP RECORDER AT END OF BATTERY LIFE: ICD-10-CM

## 2023-10-27 DIAGNOSIS — E78.2 MIXED HYPERLIPIDEMIA: ICD-10-CM

## 2023-10-27 DIAGNOSIS — I10 ESSENTIAL HYPERTENSION: ICD-10-CM

## 2023-10-27 DIAGNOSIS — Z87.898 HISTORY OF PALPITATIONS: ICD-10-CM

## 2023-10-27 DIAGNOSIS — I42.8 NON-ISCHEMIC CARDIOMYOPATHY (MULTI): ICD-10-CM

## 2023-10-27 PROCEDURE — 1036F TOBACCO NON-USER: CPT | Performed by: INTERNAL MEDICINE

## 2023-10-27 PROCEDURE — 99213 OFFICE O/P EST LOW 20 MIN: CPT | Performed by: INTERNAL MEDICINE

## 2023-10-27 PROCEDURE — 3078F DIAST BP <80 MM HG: CPT | Performed by: INTERNAL MEDICINE

## 2023-10-27 PROCEDURE — 3074F SYST BP LT 130 MM HG: CPT | Performed by: INTERNAL MEDICINE

## 2023-10-27 PROCEDURE — 1159F MED LIST DOCD IN RCRD: CPT | Performed by: INTERNAL MEDICINE

## 2023-10-27 RX ORDER — PRAVASTATIN SODIUM 10 MG/1
10 TABLET ORAL NIGHTLY
Qty: 90 TABLET | Refills: 3 | Status: SHIPPED | OUTPATIENT
Start: 2023-10-27 | End: 2024-04-12 | Stop reason: SDUPTHER

## 2023-10-27 RX ORDER — SACUBITRIL AND VALSARTAN 49; 51 MG/1; MG/1
1 TABLET, FILM COATED ORAL 2 TIMES DAILY
Qty: 180 TABLET | Refills: 3 | Status: CANCELLED | OUTPATIENT
Start: 2023-10-27

## 2023-10-27 NOTE — PROGRESS NOTES
HPI     78-year-old male returns for routine follow-up of his cardiovascular status.  Historically has been followed for nonischemic cardiomyopathy with ejection action in the range of 35 to 40% last echo done 3 years ago he had a loop recorder in for careful monitoring of any significant dysrhythmias of concern but is at the end of life and he is scheduled by Dr. Montenegro to have it removed on November 16 he is being seen today to determine whether or not removing the old one and putting a new one and would still be appropriate based on how he is doing from a symptom point review of he is doing very well and has no symptoms of concern he is very active he plays golf he does all of his activities of daily living yardwork housework and has no pain pressure heaviness tightness discomfort dizzy weak lightheaded fainting falling blacking out no significant fatigue or significant effort dyspnea he has had no falls in summary he remains very active with respect to activities of daily living and playing golf he is medication compliant he does not smoke he denies alcohol caffeine or drug abuse the review of systems was addressed with the patient today,     All other review of systems were either discussed or not relevant to the reason the patient was seen today.    Medications, vital signs, and exam all completed and discussed with patient and documented in touch works.    Today's EKG shows a sinus mechanism left axis deviation left bundle branch block and monitoring his pulse for about a minute during the exam he had no premature systoles    Patient is medication compliant with his Entresto but he only takes medium dose 4951 and he does not take any beta-blockers    Clinical impression    Nonischemic cardiomyopathy with past EF in the range of 35 to 5040% with currently no symptoms of concern    Past history of palpitations not having any symptoms of concern at this time    End-of-life loop recorder scheduled for removal  November 16 by Dr. Montenegro and the plan is as stated below to do a follow-up echo to make sure his EF is still in the same range and consider titrating up his Entresto therapy to see if we can provide any additional maximum benefit with additional therapy dose    Chronic knee and back issues with previous surgery not problematic    Overweight by criteria    Former smoker    Abnormal EKGs    No arrhythmia symptoms of concern and no obvious arrhythmias detected with current data but loop is at end-of-life and not providing current information    Plan    Obtain an echocardiogram in the near future definitely before his loop recorder removal by Dr. Montenegro so that information is available in case she wants to not only replacement put in a new one back in we will try to further increase his dose of Entresto and go to the maximum dose 97 10 3 at night and stay on the medium dose in the morning for couple weeks and if he tolerates this his blood pressure is okay we will probably try to maximize his Entresto dose for long-term benefit he is not currently taking Coreg or other beta-blockers the patient will be seen by me also in the near future with further comments and recommendations regarding his overall cardiovascular management and whether or not once again not only removing the Linq loop with putting a new 1 and so that we can continue to follow any arrhythmias of concern since he is still borderline historically EF in the range of 35 to 40% but will have a current echo to resolve this issue in terms of current status and will continue to try to titrate and maximize his Entresto therapy moving forward Dr. Montenegro can decide about whether low-dose beta-blocker such as Coreg could be added    Please excuse any errors in grammar or translation related to this dictation.  Voice recognition software was utilized to prepare this document.    Chico AYALA

## 2023-11-09 ENCOUNTER — ANCILLARY PROCEDURE (OUTPATIENT)
Dept: CARDIOLOGY | Facility: CLINIC | Age: 78
End: 2023-11-09
Payer: MEDICARE

## 2023-11-09 VITALS
DIASTOLIC BLOOD PRESSURE: 76 MMHG | WEIGHT: 198 LBS | BODY MASS INDEX: 26.82 KG/M2 | SYSTOLIC BLOOD PRESSURE: 124 MMHG | HEIGHT: 72 IN

## 2023-11-09 DIAGNOSIS — I42.8 NON-ISCHEMIC CARDIOMYOPATHY (MULTI): ICD-10-CM

## 2023-11-09 PROCEDURE — 93306 TTE W/DOPPLER COMPLETE: CPT

## 2023-11-09 PROCEDURE — 93306 TTE W/DOPPLER COMPLETE: CPT | Performed by: INTERNAL MEDICINE

## 2023-11-09 PROCEDURE — 2500000004 HC RX 250 GENERAL PHARMACY W/ HCPCS (ALT 636 FOR OP/ED): Performed by: INTERNAL MEDICINE

## 2023-11-09 RX ADMIN — HUMAN ALBUMIN MICROSPHERES AND PERFLUTREN 0.7 ML: 10; .22 INJECTION, SOLUTION INTRAVENOUS at 09:02

## 2023-11-10 LAB
AORTIC VALVE MEAN GRADIENT: 5
AORTIC VALVE PEAK VELOCITY: 1.46
AV PEAK GRADIENT: 8.5
AVA (PEAK VEL): 2.57
AVA (VTI): 2.43
EJECTION FRACTION APICAL 4 CHAMBER: 50.2
LEFT VENTRICLE INTERNAL DIMENSION DIASTOLE: 4.01 (ref 3.5–6)
LEFT VENTRICULAR OUTFLOW TRACT DIAMETER: 2.3
MITRAL VALVE E/A RATIO: 0.57
RIGHT VENTRICLE PEAK SYSTOLIC PRESSURE: 34.2

## 2023-11-13 NOTE — H&P (VIEW-ONLY)
Subjective  HPI  Follow-up on hypertension and hyperlipidemia, has been doing very well without major issues or problems.  Medications are well-tolerated.  Currently on Pravachol 10 mg along with Entresto.  Did have elevated blood sugars in the past, in the range of prediabetes we will have to have his A1c rechecked at this time  Review of Systems   Respiratory:  Negative for cough, hemoptysis and sputum production.    Cardiovascular:  Negative for chest pain, palpitations and orthopnea.          Objective  Physical Exam  Cardiovascular:      Rate and Rhythm: Normal rate and regular rhythm.      Pulses: Normal pulses.      Heart sounds: Normal heart sounds.   Pulmonary:      Effort: Pulmonary effort is normal.      Breath sounds: Normal breath sounds.          ASSESSMENT/PLAN:  1. Mixed hyperlipidemia - ICD9: 272.2, ICD10: E78.2 (primary diagnosis)  -  Controlled  -  Continue current medications  -  Counseled on healthy diet and regular exercise  -  COMP METABOLIC PANEL  -  URINALYSIS, REFLEX MICROSCOPIC  -  LIPID PANEL BASIC    2. Encounter for immunization - ICD9: V03.89, ICD10: Z23  -  INFLUENZA VACCINE, PRSV FREE, AGE 65+ YR, HIGH DOSE, QUADRIVALENT (FLUZONE HIGH-DOSE)    3. Primary hypertension - ICD9: 401.9, ICD10: I10  -  Controlled  -  Continue current medications  -  Recommend home blood pressure monitoring, to bring results to next visit  -  Encouraged sodium restriction, DASH or Mediterranean diet  -  Recommend regular aerobic exercise  -  ALBUMIN/CREAT RATIO RND UR  -  MAGNESIUM BLD    4. Hyperglycemia - ICD9: 790.29, ICD10: R73.9  -  HGB A1C    Chasidy Marie MD

## 2023-11-15 ENCOUNTER — APPOINTMENT (OUTPATIENT)
Dept: CARDIOLOGY | Facility: HOSPITAL | Age: 78
End: 2023-11-15
Payer: MEDICARE

## 2023-11-15 ENCOUNTER — HOSPITAL ENCOUNTER (OUTPATIENT)
Facility: HOSPITAL | Age: 78
Setting detail: OUTPATIENT SURGERY
Discharge: HOME | End: 2023-11-15
Attending: INTERNAL MEDICINE | Admitting: INTERNAL MEDICINE
Payer: MEDICARE

## 2023-11-15 VITALS
DIASTOLIC BLOOD PRESSURE: 67 MMHG | SYSTOLIC BLOOD PRESSURE: 139 MMHG | OXYGEN SATURATION: 98 % | HEART RATE: 79 BPM | BODY MASS INDEX: 26.7 KG/M2 | WEIGHT: 197.09 LBS | RESPIRATION RATE: 18 BRPM | TEMPERATURE: 97.7 F | HEIGHT: 72 IN

## 2023-11-15 DIAGNOSIS — I42.8 NON-ISCHEMIC CARDIOMYOPATHY (MULTI): ICD-10-CM

## 2023-11-15 DIAGNOSIS — Z87.898 HISTORY OF PALPITATIONS: ICD-10-CM

## 2023-11-15 DIAGNOSIS — Z45.09 ENCOUNTER FOR LOOP RECORDER AT END OF BATTERY LIFE: Primary | ICD-10-CM

## 2023-11-15 LAB
APTT PPP: 30 SECONDS (ref 27–38)
ATRIAL RATE: 88 BPM
ERYTHROCYTE [DISTWIDTH] IN BLOOD BY AUTOMATED COUNT: 12.2 % (ref 11.5–14.5)
HCT VFR BLD AUTO: 44.9 % (ref 41–52)
HGB BLD-MCNC: 15.4 G/DL (ref 13.5–17.5)
INR PPP: 1.1 (ref 0.9–1.1)
MCH RBC QN AUTO: 34.4 PG (ref 26–34)
MCHC RBC AUTO-ENTMCNC: 34.3 G/DL (ref 32–36)
MCV RBC AUTO: 100 FL (ref 80–100)
NRBC BLD-RTO: 0 /100 WBCS (ref 0–0)
P AXIS: 53 DEGREES
P OFFSET: 183 MS
P ONSET: 124 MS
PLATELET # BLD AUTO: 212 X10*3/UL (ref 150–450)
PR INTERVAL: 180 MS
PROTHROMBIN TIME: 12.8 SECONDS (ref 9.8–12.8)
Q ONSET: 214 MS
QRS COUNT: 15 BEATS
QRS DURATION: 160 MS
QT INTERVAL: 414 MS
QTC CALCULATION(BAZETT): 500 MS
QTC FREDERICIA: 470 MS
R AXIS: -52 DEGREES
RBC # BLD AUTO: 4.48 X10*6/UL (ref 4.5–5.9)
T AXIS: 106 DEGREES
T OFFSET: 421 MS
VENTRICULAR RATE: 88 BPM
WBC # BLD AUTO: 9.2 X10*3/UL (ref 4.4–11.3)

## 2023-11-15 PROCEDURE — 33286 RMVL SUBQ CAR RHYTHM MNTR: CPT

## 2023-11-15 PROCEDURE — 33286 RMVL SUBQ CAR RHYTHM MNTR: CPT | Performed by: INTERNAL MEDICINE

## 2023-11-15 PROCEDURE — 93005 ELECTROCARDIOGRAM TRACING: CPT

## 2023-11-15 PROCEDURE — 85027 COMPLETE CBC AUTOMATED: CPT | Performed by: NURSE PRACTITIONER

## 2023-11-15 PROCEDURE — 36415 COLL VENOUS BLD VENIPUNCTURE: CPT | Performed by: NURSE PRACTITIONER

## 2023-11-15 PROCEDURE — 93010 ELECTROCARDIOGRAM REPORT: CPT | Performed by: INTERNAL MEDICINE

## 2023-11-15 PROCEDURE — 7100000009 HC PHASE TWO TIME - INITIAL BASE CHARGE: Performed by: INTERNAL MEDICINE

## 2023-11-15 PROCEDURE — 2500000004 HC RX 250 GENERAL PHARMACY W/ HCPCS (ALT 636 FOR OP/ED): Performed by: INTERNAL MEDICINE

## 2023-11-15 PROCEDURE — 2720000007 HC OR 272 NO HCPCS: Performed by: INTERNAL MEDICINE

## 2023-11-15 PROCEDURE — 99153 MOD SED SAME PHYS/QHP EA: CPT | Performed by: INTERNAL MEDICINE

## 2023-11-15 PROCEDURE — 85610 PROTHROMBIN TIME: CPT | Performed by: NURSE PRACTITIONER

## 2023-11-15 PROCEDURE — 93285 PRGRMG DEV EVAL SCRMS IP: CPT | Performed by: INTERNAL MEDICINE

## 2023-11-15 PROCEDURE — 7100000010 HC PHASE TWO TIME - EACH INCREMENTAL 1 MINUTE: Performed by: INTERNAL MEDICINE

## 2023-11-15 PROCEDURE — 2500000004 HC RX 250 GENERAL PHARMACY W/ HCPCS (ALT 636 FOR OP/ED): Performed by: NURSE PRACTITIONER

## 2023-11-15 PROCEDURE — 2500000001 HC RX 250 WO HCPCS SELF ADMINISTERED DRUGS (ALT 637 FOR MEDICARE OP): Performed by: NURSE PRACTITIONER

## 2023-11-15 PROCEDURE — 93291 INTERROG DEV EVAL SCRMS IP: CPT | Performed by: INTERNAL MEDICINE

## 2023-11-15 PROCEDURE — 99152 MOD SED SAME PHYS/QHP 5/>YRS: CPT | Performed by: INTERNAL MEDICINE

## 2023-11-15 PROCEDURE — 2500000005 HC RX 250 GENERAL PHARMACY W/O HCPCS: Performed by: INTERNAL MEDICINE

## 2023-11-15 PROCEDURE — 99222 1ST HOSP IP/OBS MODERATE 55: CPT | Performed by: INTERNAL MEDICINE

## 2023-11-15 RX ORDER — MUPIROCIN 20 MG/G
1 OINTMENT TOPICAL ONCE
Status: COMPLETED | OUTPATIENT
Start: 2023-11-15 | End: 2023-11-15

## 2023-11-15 RX ORDER — FENTANYL CITRATE 50 UG/ML
INJECTION, SOLUTION INTRAMUSCULAR; INTRAVENOUS AS NEEDED
Status: DISCONTINUED | OUTPATIENT
Start: 2023-11-15 | End: 2023-11-15 | Stop reason: HOSPADM

## 2023-11-15 RX ORDER — ASPIRIN 81 MG/1
81 TABLET ORAL DAILY
Start: 2023-11-15

## 2023-11-15 RX ORDER — CEFAZOLIN SODIUM 1 G/50ML
1 SOLUTION INTRAVENOUS ONCE
Status: COMPLETED | OUTPATIENT
Start: 2023-11-15 | End: 2023-11-15

## 2023-11-15 RX ORDER — MIDAZOLAM HYDROCHLORIDE 1 MG/ML
INJECTION INTRAMUSCULAR; INTRAVENOUS AS NEEDED
Status: DISCONTINUED | OUTPATIENT
Start: 2023-11-15 | End: 2023-11-15 | Stop reason: HOSPADM

## 2023-11-15 RX ORDER — LIDOCAINE HYDROCHLORIDE 10 MG/ML
INJECTION, SOLUTION EPIDURAL; INFILTRATION; INTRACAUDAL; PERINEURAL AS NEEDED
Status: DISCONTINUED | OUTPATIENT
Start: 2023-11-15 | End: 2023-11-15 | Stop reason: HOSPADM

## 2023-11-15 RX ORDER — CHLORHEXIDINE GLUCONATE 40 MG/ML
SOLUTION TOPICAL ONCE
Status: COMPLETED | OUTPATIENT
Start: 2023-11-15 | End: 2023-11-15

## 2023-11-15 RX ORDER — SODIUM CHLORIDE 9 MG/ML
20 INJECTION, SOLUTION INTRAVENOUS CONTINUOUS
Status: DISCONTINUED | OUTPATIENT
Start: 2023-11-15 | End: 2023-11-15

## 2023-11-15 RX ADMIN — SODIUM CHLORIDE 20 ML/HR: 9 INJECTION, SOLUTION INTRAVENOUS at 07:15

## 2023-11-15 RX ADMIN — CEFAZOLIN SODIUM 1 G: 1 INJECTION, SOLUTION INTRAVENOUS at 07:00

## 2023-11-15 RX ADMIN — Medication: at 07:15

## 2023-11-15 RX ADMIN — MUPIROCIN 1 APPLICATION: 20 OINTMENT TOPICAL at 07:14

## 2023-11-15 ASSESSMENT — COLUMBIA-SUICIDE SEVERITY RATING SCALE - C-SSRS
2. HAVE YOU ACTUALLY HAD ANY THOUGHTS OF KILLING YOURSELF?: NO
1. IN THE PAST MONTH, HAVE YOU WISHED YOU WERE DEAD OR WISHED YOU COULD GO TO SLEEP AND NOT WAKE UP?: NO
6. HAVE YOU EVER DONE ANYTHING, STARTED TO DO ANYTHING, OR PREPARED TO DO ANYTHING TO END YOUR LIFE?: NO

## 2023-11-15 ASSESSMENT — PAIN - FUNCTIONAL ASSESSMENT: PAIN_FUNCTIONAL_ASSESSMENT: 0-10

## 2023-11-15 ASSESSMENT — PAIN SCALES - GENERAL: PAINLEVEL_OUTOF10: 0 - NO PAIN

## 2023-11-15 NOTE — INTERVAL H&P NOTE
H&P reviewed. The patient was examined and there are no changes to the H&P.    See my OV also.  Patient anxious about being in hospital.  He denies any other new symptoms.    Informed consent confirmed.

## 2023-11-22 ENCOUNTER — CLINICAL SUPPORT (OUTPATIENT)
Dept: CARDIOLOGY | Facility: CLINIC | Age: 78
End: 2023-11-22
Payer: MEDICARE

## 2023-11-22 DIAGNOSIS — Z95.818 STATUS POST PLACEMENT OF IMPLANTABLE LOOP RECORDER: ICD-10-CM

## 2023-11-22 NOTE — PROGRESS NOTES
Pt. here for wound check of loop explant by Dr. Montenegro on 11/15/23 at [facility]. L clavicular area is well approximated with no erythema or drainage. Pt. denies any fever or chills. Temp 98.6

## 2023-12-01 ENCOUNTER — OFFICE VISIT (OUTPATIENT)
Dept: CARDIOLOGY | Facility: CLINIC | Age: 78
End: 2023-12-01
Payer: MEDICARE

## 2023-12-01 ENCOUNTER — APPOINTMENT (OUTPATIENT)
Dept: CARDIOLOGY | Facility: CLINIC | Age: 78
End: 2023-12-01
Payer: MEDICARE

## 2023-12-01 VITALS
DIASTOLIC BLOOD PRESSURE: 56 MMHG | HEART RATE: 74 BPM | SYSTOLIC BLOOD PRESSURE: 124 MMHG | HEIGHT: 72 IN | WEIGHT: 198 LBS | BODY MASS INDEX: 26.82 KG/M2

## 2023-12-01 DIAGNOSIS — E78.2 MIXED HYPERLIPIDEMIA: ICD-10-CM

## 2023-12-01 DIAGNOSIS — Z87.891 FORMER SMOKER: ICD-10-CM

## 2023-12-01 DIAGNOSIS — Z87.898 HISTORY OF PALPITATIONS: ICD-10-CM

## 2023-12-01 DIAGNOSIS — I10 ESSENTIAL HYPERTENSION: ICD-10-CM

## 2023-12-01 DIAGNOSIS — I42.8 NON-ISCHEMIC CARDIOMYOPATHY (MULTI): ICD-10-CM

## 2023-12-01 PROCEDURE — 1159F MED LIST DOCD IN RCRD: CPT | Performed by: INTERNAL MEDICINE

## 2023-12-01 PROCEDURE — 1126F AMNT PAIN NOTED NONE PRSNT: CPT | Performed by: INTERNAL MEDICINE

## 2023-12-01 PROCEDURE — 99214 OFFICE O/P EST MOD 30 MIN: CPT | Performed by: INTERNAL MEDICINE

## 2023-12-01 PROCEDURE — 1036F TOBACCO NON-USER: CPT | Performed by: INTERNAL MEDICINE

## 2023-12-01 PROCEDURE — 3074F SYST BP LT 130 MM HG: CPT | Performed by: INTERNAL MEDICINE

## 2023-12-01 PROCEDURE — 3078F DIAST BP <80 MM HG: CPT | Performed by: INTERNAL MEDICINE

## 2023-12-01 RX ORDER — CARVEDILOL 3.12 MG/1
3.12 TABLET ORAL
Qty: 180 TABLET | Refills: 1 | Status: SHIPPED | OUTPATIENT
Start: 2023-12-01

## 2023-12-01 NOTE — PROGRESS NOTES
HPI    78-year-old male returns for routine follow-up of his cardiovascular status.  Historically has been followed for nonischemic cardiomyopathy with ejection fraction by echo about 3 years ago in the range of 35 to 40% but this fall now in the range of 45 to 50% showing definite improvement on his current regimen of medications he is on Entresto he takes 2 tablets at the highest dose of every other day and 1 tablet every other day and we have been in the midst of titrating him up to maximize his Entresto dosing he is also not currently taking any coronary from a symptom point of view he is doing very well and denies any symptoms of concern such as being dizzy weak lightheaded fainting falling blacking out etc. he is very active with respect to lifestyle playing golf 3-4 times a week when the weather permits he is not restricted in any way he has had chronic arthritic complaints not an issue he had a loop recorder in the past but been removed by Dr. Montenegro he is a former smoker but has not smoked for many years and denies alcohol caffeine or drug abuse    The review of systems was addressed with the patient today, All other review of systems were either discussed or not relevant to the reason the patient was seen today.    Medications, vital signs, and exam all completed and discussed with patient and documented in touch works.    Clinical impression    Nonischemic cardiomyopathy but doing well with this significant improvement on his last echo now in the range of 45-50 follow-up 2023 nearly on maximum dose of Entresto and we are working on titrating that up so that he is doing the maximum dose 1 tablet twice daily by titrating up his current dose over the next few weeks or so every other day and if he tolerates that the maximum dose of twice daily on his Entresto and the intent is at some point in time probably at least 3 months after he has been on the maximum dose of Entresto to repeat his echocardiogram we will  also empirically low-dose Coreg 3.125 mg twice daily and he very significant monitors his pulse and blood pressure he provided a list he typically checks at at least 3 times a day and his blood pressures are typically in the 101/54 to 134/75 and his pulse is generally in the 70s and 80s this information is cited in the chart    Essential hypertension    Dyslipidemia    History of palpitations without clinical recurrences    Status post loop recorder removed    Overweight by criteria    Former smoker    Plan    As mentioned above extensive discussion regarding the ability to return to maximize his Entresto therapy as long as he carefully follows his blood pressure and has no symptoms of concern such as being dizzy weak or lightheaded and 3 months after he is on the maximum dose 97.03 twice daily we will check an echocardiogram to see current status we will also empirically add low-dose Coreg 3.125 twice daily    Patient was seen on a routine basis in 6 months he will probably follow-up with Dr Shaffer call if interval problems questions or concerns particular as he titrates up his Entresto if there are any issues that need to be further addressed modified etc.    Please excuse any errors in grammar or translation related to this dictation.  Voice recognition software was utilized to prepare this document.      Chico AYALA

## 2024-04-09 DIAGNOSIS — M48.061 SPINAL STENOSIS OF LUMBAR REGION WITHOUT NEUROGENIC CLAUDICATION: Primary | ICD-10-CM

## 2024-04-10 RX ORDER — PREDNISONE 20 MG/1
20 TABLET ORAL DAILY
Qty: 7 TABLET | Refills: 0 | Status: SHIPPED | OUTPATIENT
Start: 2024-04-10 | End: 2024-04-17

## 2024-04-12 DIAGNOSIS — E78.2 MIXED HYPERLIPIDEMIA: ICD-10-CM

## 2024-04-12 RX ORDER — PRAVASTATIN SODIUM 10 MG/1
10 TABLET ORAL NIGHTLY
Qty: 90 TABLET | Refills: 1 | Status: SHIPPED | OUTPATIENT
Start: 2024-04-12

## 2024-05-21 DIAGNOSIS — M48.061 SPINAL STENOSIS OF LUMBAR REGION WITHOUT NEUROGENIC CLAUDICATION: ICD-10-CM

## 2024-05-21 DIAGNOSIS — M51.36 DDD (DEGENERATIVE DISC DISEASE), LUMBAR: Primary | ICD-10-CM

## 2024-05-21 RX ORDER — PREDNISONE 20 MG/1
20 TABLET ORAL DAILY
Qty: 7 TABLET | Refills: 0 | Status: SHIPPED | OUTPATIENT
Start: 2024-05-21 | End: 2024-05-28

## 2024-06-11 ENCOUNTER — INITIAL CONSULT (OUTPATIENT)
Age: 79
End: 2024-06-11
Payer: COMMERCIAL

## 2024-06-11 VITALS
DIASTOLIC BLOOD PRESSURE: 64 MMHG | SYSTOLIC BLOOD PRESSURE: 108 MMHG | WEIGHT: 201.2 LBS | BODY MASS INDEX: 27.25 KG/M2 | TEMPERATURE: 97.7 F | HEIGHT: 72 IN

## 2024-06-11 DIAGNOSIS — M48.062 SPINAL STENOSIS OF LUMBAR REGION WITH NEUROGENIC CLAUDICATION: Primary | ICD-10-CM

## 2024-06-11 PROBLEM — M96.1 FAILED BACK SYNDROME: Status: ACTIVE | Noted: 2024-06-11

## 2024-06-11 PROCEDURE — 1123F ACP DISCUSS/DSCN MKR DOCD: CPT | Performed by: STUDENT IN AN ORGANIZED HEALTH CARE EDUCATION/TRAINING PROGRAM

## 2024-06-11 PROCEDURE — 3078F DIAST BP <80 MM HG: CPT | Performed by: STUDENT IN AN ORGANIZED HEALTH CARE EDUCATION/TRAINING PROGRAM

## 2024-06-11 PROCEDURE — 99204 OFFICE O/P NEW MOD 45 MIN: CPT | Performed by: STUDENT IN AN ORGANIZED HEALTH CARE EDUCATION/TRAINING PROGRAM

## 2024-06-11 PROCEDURE — 3074F SYST BP LT 130 MM HG: CPT | Performed by: STUDENT IN AN ORGANIZED HEALTH CARE EDUCATION/TRAINING PROGRAM

## 2024-06-11 RX ORDER — BACLOFEN 5 MG/1
10 TABLET ORAL 2 TIMES DAILY PRN
Qty: 60 TABLET | Refills: 1 | Status: SHIPPED | OUTPATIENT
Start: 2024-06-11 | End: 2024-06-12

## 2024-06-11 ASSESSMENT — ENCOUNTER SYMPTOMS: BACK PAIN: 1

## 2024-06-11 NOTE — ASSESSMENT & PLAN NOTE
Chronic illness with a severe exacerbation and/or progression which affects ADL's. Fatigue/weakness/pain has been present for multiple years and is expected to last at least a year. Patient is unable to transfer nor ambulate. Goals of care include pain relief >50% and ability to perform ADLs with less pain.    78-year-old male with history of lumbar laminectomy and fusion back in 2013 who presents with low back fatigue/weakness for multiple years.  The sensation is extremely debilitating and affects ADLs.  The symptoms limit mobility and functionality.  Of note, patient does not complain of severe pain.  The symptoms he is most concerned with are fatigue/weakness which likely resemble spinal stenosis and claudication.    On physical examination, patient displays lumbar tenderness.  Straight leg raise is negative bilaterally.  He does display positive shopping cart sign.    No recent MRI on file.    Given his history, physical exam, and lack of response to current measures, I believe he will benefit from the following:    - Lumbar MRI without contrast ordered  - Referral to physical therapy  - Begin baclofen 5 mg twice daily as needed

## 2024-06-11 NOTE — PROGRESS NOTES
HPI  Onset: a couple years  Location: low back  Quality: tiring  Pain states that his pain is at a 1 at rest and a 1 with activity on the pain scale.   The pain is present: Constantly  and consistent  The pain is exacerbated by: Walking, Bending, and Lifting and alleviated by:  patient \"just lives with the feeling\" .  The patient states the pain interferes with his  ADL's : Sometimes Transferring  and Ambulating   Recent falls: no  Bladder bowel dysfunction:no  He is taking the following medications for pain:   None  Prior treatments tried for chief complaint listed above:   Surgery: yes: August 2013 lumbar laminectomy, cage and fusion  Physical Therapy: no.  Chiropractor: no  Acupuncture: no  Massage Therapy: no   Behavior/Wellness/Psychological support: yes: PCP  Expectations of Treatment at the Pain Center: The patient presents today for evaluation with the expectation that they will be able to perform their ADL's without excruciating pain.   Patient denies the following symptoms: Ataxia, saddle anesthesia, nausea, fever, vomiting, or recent antibiotics.

## 2024-06-11 NOTE — PROGRESS NOTES
[facility] [place] [facility], [facility] PAIN MANAGEMENT  [address] [address]  Dept: [phone]  Dept Fax: [phone]  Loc: [phone]     6/11/2024    Visit type: New patient    Reason for Visit: New Patient       ASSESSMENT/PLAN   1. Spinal stenosis of lumbar region with neurogenic claudication  Assessment & Plan:     Chronic illness with a severe exacerbation and/or progression which affects ADL's. Fatigue/weakness/pain has been present for multiple years and is expected to last at least a year. Patient is unable to transfer nor ambulate. Goals of care include pain relief >50% and ability to perform ADLs with less pain.    78-year-old male with history of lumbar laminectomy and fusion back in 2013 who presents with low back fatigue/weakness for multiple years.  The sensation is extremely debilitating and affects ADLs.  The symptoms limit mobility and functionality.  Of note, patient does not complain of severe pain.  The symptoms he is most concerned with are fatigue/weakness which likely resemble spinal stenosis and claudication.    On physical examination, patient displays lumbar tenderness.  Straight leg raise is negative bilaterally.  He does display positive shopping cart sign.    No recent MRI on file.    Given his history, physical exam, and lack of response to current measures, I believe he will benefit from the following:    - Lumbar MRI without contrast ordered  - Referral to physical therapy  - Begin baclofen 5 mg twice daily as needed    Orders:  -     MRI LUMBAR SPINE WO CONTRAST; Future  -     Baclofen (LIORESAL) 5 MG tablet; Take 2 tablets by mouth 2 times daily as needed (spasms), Disp-60 tablet, R-1Normal  -     Ambulatory referral to Physical Therapy    Return in about 6 weeks (around 7/23/2024) for PT and MRI assessment .  Orders Placed This Encounter   Medications    Baclofen (LIORESAL) 5 MG tablet     Sig: Take 2 tablets by mouth 2

## 2024-06-11 NOTE — PATIENT INSTRUCTIONS
Patient Education        Lumbar Spinal Stenosis: Care Instructions  Overview     Stenosis in the spine is a narrowing of the canal that is around the spinal cord and nerve roots in your back. It can happen as part of aging. Sometimes bone and other tissue grow into this canal and press on the nerves that branch out from the spinal cord. This can cause pain, numbness, and weakness. When it happens in the lower part of your back, it is called lumbar spinal stenosis. It can cause problems in the legs, feet, and rear end (buttocks).  You may be able to get relief from the symptoms of spinal stenosis by taking medicine. Your doctor may suggest physical therapy and exercises to keep your spine strong and flexible. Some people try steroid shots to reduce swelling. If pain and numbness in your legs are still so bad that you cannot do your normal activities, you may need surgery.  Follow-up care is a key part of your treatment and safety. Be sure to make and go to all appointments, and call your doctor if you are having problems. It's also a good idea to know your test results and keep a list of the medicines you take.  How can you care for yourself at home?  Take an over-the-counter pain medicine. Nonsteroidal anti-inflammatory drugs (NSAIDs) such as ibuprofen or naproxen seem to work best. But if you can't take NSAIDs, you can try acetaminophen. Be safe with medicines. Read and follow all instructions on the label.  Do not take two or more pain medicines at the same time unless the doctor told you to. Many pain medicines have acetaminophen, which is Tylenol. Too much acetaminophen (Tylenol) can be harmful.  Stay at a healthy weight. Being overweight puts extra strain on your spine.  Change positions often when you sit or stand. This can ease pain. It may also reduce pressure on the spinal cord and its nerves.  Avoid doing things that make your symptoms worse. Walking downhill and standing for a long time may cause

## 2024-06-12 ENCOUNTER — TELEPHONE (OUTPATIENT)
Dept: PAIN MANAGEMENT | Age: 79
End: 2024-06-12

## 2024-06-12 DIAGNOSIS — M48.062 SPINAL STENOSIS OF LUMBAR REGION WITH NEUROGENIC CLAUDICATION: ICD-10-CM

## 2024-06-12 RX ORDER — BACLOFEN 5 MG/1
5 TABLET ORAL 2 TIMES DAILY PRN
Qty: 60 TABLET | Refills: 1 | Status: SHIPPED | OUTPATIENT
Start: 2024-06-12

## 2024-06-12 NOTE — TELEPHONE ENCOUNTER
Prior authorization has been started for Baclofen 5 mg tablet on Cover My Meds, clinical uploaded, authorization pending Key: [id number]  PA Case ID #: [id number]  Rx #: [id number]      The request for Baclofen tablet 5 mg, 60 tablets per 15 days exceeds the amount allowed by the plan.  The current quantity covered by Medicare Part D drug plan for this drug is: Baclofen tablet 5 mg, 90 tablets per 30 days.

## 2024-06-18 ENCOUNTER — HOSPITAL ENCOUNTER (OUTPATIENT)
Dept: PHYSICAL THERAPY | Age: 79
Setting detail: THERAPIES SERIES
Discharge: HOME | End: 2024-06-18
Attending: STUDENT IN AN ORGANIZED HEALTH CARE EDUCATION/TRAINING PROGRAM
Payer: COMMERCIAL

## 2024-06-18 PROCEDURE — 97110 THERAPEUTIC EXERCISES: CPT

## 2024-06-18 PROCEDURE — 97161 PT EVAL LOW COMPLEX 20 MIN: CPT

## 2024-06-18 NOTE — PLAN OF CARE
[facility]  [address] [place] [address]  Phone: [phone]    [x] Certification  [] Recertification [x]  Plan of Care  [] Progress Note [] Discharge      Referring Provider: Edwin Meléndez, DO     From:  Enrique Jade, PT, DPT  Patient: Anibal Hinkle (78 y.o. male) : 1945 Date: 2024  Medical Diagnosis: Spinal stenosis of lumbar region with neurogenic claudication [M48.062]       Treatment Diagnosis: low back weakness, LE weakness, decreased ambulatory tolerance    Plan of Care/Certification Expiration Date: 24   Progress Report Period from:  2024  to 2024    Visits to Date: 1 No Show: 0 Cancelled Appts: 0    OBJECTIVE:   Long Term Goals - Time Frame for Long Term Goals : 4 weeks  Goals Current/ Discharge status Status   Long Term Goal 1: Pt will be independent with recommended HEP for LE strengthening.  HEP started; progressions ongoing   New   Long Term Goal 2: Pt will demo at least 1/2 MMT strength improvement in hips, low back, and L ankle to assist in ease of functional mobility.  Strength LLE  L Hip Flexion: 5/5  L Hip Extension: 4/5  L Hip ABduction: 4/5  L Hip ADduction: 5/5  L Knee Flexion: 5/5  L Knee Extension: 5/5  L Ankle Dorsiflexion: 5/5  L Ankle Plantar Flexion: 4/5  L Ankle Inversion: 4+/5  L Ankle Eversion: 5/5    Strength RLE  R Hip Flexion: 5/5  R Hip Extension: 4/5  R Hip ABduction: 4/5  R Hip ADduction: 5/5  R Knee Flexion: 5/5  R Knee Extension: 5/5  R Ankle Dorsiflexion: 5/5  R Ankle Plantar flexion: 5/5  R Ankle Inversion: 5/5  R Ankle Eversion: 5/5          Grossly 4/5; low back fatigue reported with prolonged standing/walking activity      New   Long Term Goal 3: Pt will demo improved standing posture with verbalized increase in walking/standing duration.  Flexed standing posture  Decreased lumbar lordosis  Lumbar paraspinal muscles tight when patient standing  Reported decrease in ambulatory

## 2024-06-18 NOTE — PROGRESS NOTES
reported with prolonged standing/walking activity       Outcomes Score:  Exam: renae hip weakness, L ankle weakness, low back weakness    Treatment:    Exercises:   Exercises  Exercise 1: calf raises: 3x/wk, 30-40 reps  Exercise 2: BW squats: 3x/wk, 30-40 reps  Exercise 3: *box step ups  Exercise 4: *apollo leg press  Exercise 5: *banded side stepping  Exercise 6: *back extension iso walkout  Exercise 7: *cross body pulls        Manual:   *low back STM to reduce muscle tightness PRN     *Indicates exercise,modality, or manual techniques to be initiated when appropriate       ASSESSMENT     Impression: Assessment: Pt is a 77 yo male. Pt presents to therapy with primary complaints of progressive leg weakness over the last year. Pt displays good LE strength with exception to hip abduction, hip extension, and L ankle plantar flexion. Pt also has a flexed posture with reports of low back weakness/fatigue. Pt has no pain complaints at this time. Pt has no LE paresthesias. Pt can benefit from continuation of PT services to address functional strengthening and establishing a personalized HEP pt can continue to improve areas of noted weakness for improved mobility and functional tolerance.    Body Structures, Functions, Activity Limitations Requiring Skilled Therapeutic Intervention: Decreased functional mobility , Decreased strength, Decreased posture    Statement of Medical Necessity: Physical Therapy is both indicated and medically necessary as outlined in the POC to increase the likelihood of meeting the functionally related goals stated below.     Patient's Activity Tolerance: Patient tolerated evaluation without incident      Patient's rehabilitation potential/prognosis is considered to be: Good    Factors which may impact rehabilitation potential include: Chronicity of problem     Patient Education: PT Education: PT Role, Plan of Care, Evaluative findings, Goals, Home Exercise Program, Posture, Anatomy of condition

## 2024-06-20 ENCOUNTER — HOSPITAL ENCOUNTER (OUTPATIENT)
Dept: MRI IMAGING | Age: 79
Discharge: HOME | End: 2024-06-22
Attending: STUDENT IN AN ORGANIZED HEALTH CARE EDUCATION/TRAINING PROGRAM
Payer: COMMERCIAL

## 2024-06-20 DIAGNOSIS — M48.062 SPINAL STENOSIS OF LUMBAR REGION WITH NEUROGENIC CLAUDICATION: ICD-10-CM

## 2024-06-20 PROCEDURE — 72148 MRI LUMBAR SPINE W/O DYE: CPT

## 2024-06-25 ENCOUNTER — HOSPITAL ENCOUNTER (OUTPATIENT)
Dept: PHYSICAL THERAPY | Age: 79
Setting detail: THERAPIES SERIES
Discharge: HOME | End: 2024-06-25
Attending: STUDENT IN AN ORGANIZED HEALTH CARE EDUCATION/TRAINING PROGRAM
Payer: COMMERCIAL

## 2024-06-25 PROCEDURE — 97110 THERAPEUTIC EXERCISES: CPT

## 2024-06-25 NOTE — PROGRESS NOTES
[facility]  [address] [place] [address]  Phone: [phone]      Date: 2024  Patient: Anibal Hinkle  : 1945   Confirmed: Yes  MRN: [id number]  Referring Provider: Edwin Meléndez DO    Medical Diagnosis: Spinal stenosis of lumbar region with neurogenic claudication [M48.062]       Treatment Diagnosis: low back weakness, LE weakness, decreased ambulatory tolerance    Visit Information:  Insurance: Payor: [employer] / Plan: [employer] PLANS / Product Type: *No Product type* /   PT Visit Information  PT Insurance Information: Devoted  Total # of Visits Approved: 99  Total # of Visits to Date: 2  Plan of Care/Certification Expiration Date: 24  No Show: 0  Progress Note Due Date: 24  Canceled Appointment: 0  Progress Note Counter:     Subjective Information:  Subjective: Pt reports performing HEP since last visit, needed to modify to use of counter top support for improved tolerance with mini squats and HRs  HEP Compliance:  [x] Good [] Fair [] Poor [] Reports not doing due to:         Treatment:  Exercises:  Exercises  Exercise 1: calf raises: 2 x 15  Exercise 2: BW squats: 2 x 15  Exercise 3: box step ups: lateral cross overs 6 inch step x 10 .  Exercise 4: apollo leg press: Foot position low (quad) 30# 2 x 10 / Foot position High (glute) 30# 2 x 10  Exercise 6: *back extension iso walkout Apollo 40# x 10 Retro  Exercise 8: StarTrac Bike Seat 5 lvl 4 for warm up prior to exercises.  Exercise 9: Sled push/pull 25' x 5  Exercise 20: HEP:  Cont. with current       Manual:           Modalities:          *Indicates exercise, modality, or manual techniques to be initiated when appropriate    Objective Measures:                                                                         Assessment:   Body Structures, Functions, Activity Limitations Requiring Skilled Therapeutic Intervention: Decreased functional mobility , Decreased strength, Decreased

## 2024-06-27 ENCOUNTER — HOSPITAL ENCOUNTER (OUTPATIENT)
Dept: PHYSICAL THERAPY | Age: 79
Setting detail: THERAPIES SERIES
Discharge: HOME | End: 2024-06-27
Attending: STUDENT IN AN ORGANIZED HEALTH CARE EDUCATION/TRAINING PROGRAM
Payer: COMMERCIAL

## 2024-06-27 PROCEDURE — 97110 THERAPEUTIC EXERCISES: CPT

## 2024-06-27 NOTE — PROGRESS NOTES
[facility]  [address] [place] [address]  Phone: [phone]      Date: 2024  Patient: Anibal Hinkle  : 1945   Confirmed: Yes  MRN: [id number]  Referring Provider: Edwin Meléndez DO    Medical Diagnosis: Spinal stenosis of lumbar region with neurogenic claudication [M48.062]       Treatment Diagnosis: low back weakness, LE weakness, decreased ambulatory tolerance    Visit Information:  Insurance: Payor: [employer] / Plan: [employer] PLANS / Product Type: *No Product type* /   PT Visit Information  PT Insurance Information: Devoted  Total # of Visits Approved: 99  Total # of Visits to Date: 3  Plan of Care/Certification Expiration Date: 24  No Show: 0  Progress Note Due Date: 24  Canceled Appointment: 0  Progress Note Counter: 3/8    Subjective Information:  Subjective: Pt reports mild discomfort/soreness in the LE but better today. States he does HEP daily.  HEP Compliance:  [x] Good [] Fair [] Poor [] Reports not doing due to:    Pain Screening  Patient Currently in Pain: No    Treatment:  Exercises:  Exercises  Exercise 1: calf raises:  x 15  Exercise 2: BW squats:  x 15  Exercise 3: HS stretch 3 x 20\"  Exercise 4: apollo leg press: Foot position low (quad) 30# 1 x 10 / Foot position High (glute) 30# 2 x 10  Exercise 5: banded side stepping x 4 laps (outside // bars)  Exercise 6: back extension iso walkout Apollo 40# x 10 Retro  Exercise 7: cross body pulls x 10-15 renae (20#)  Exercise 8: StarTrac Bike Seat 5 lvl 4 for warm up prior to exercises. x 5 minutes  Exercise 20: HEP:  Cont. with current         Objective Measures:       Assessment:   Body Structures, Functions, Activity Limitations Requiring Skilled Therapeutic Intervention: Decreased functional mobility , Decreased strength, Decreased posture  Assessment: Continued progressions this date adding light resisted rotations and additional hip strengthening. Pt tolerated cable cross overs and

## 2024-07-02 ENCOUNTER — HOSPITAL ENCOUNTER (OUTPATIENT)
Dept: PHYSICAL THERAPY | Age: 79
Setting detail: THERAPIES SERIES
Discharge: HOME | End: 2024-07-02
Attending: STUDENT IN AN ORGANIZED HEALTH CARE EDUCATION/TRAINING PROGRAM
Payer: COMMERCIAL

## 2024-07-02 PROCEDURE — 97110 THERAPEUTIC EXERCISES: CPT

## 2024-07-02 NOTE — PROGRESS NOTES
[facility]  [address] [place] [address]  Phone: [phone]      Date: 2024  Patient: Anibal Hinkle  : 1945   Confirmed: Yes  MRN: [id number]  Referring Provider: Edwin Meléndez DO    Medical Diagnosis: Spinal stenosis of lumbar region with neurogenic claudication [M48.062]       Treatment Diagnosis: low back weakness, LE weakness, decreased ambulatory tolerance    Visit Information:  Insurance: Payor: [employer]AN / Plan: [employer] PLANS / Product Type: *No Product type* /   PT Visit Information  PT Insurance Information: Devoted  Total # of Visits Approved: 99  Total # of Visits to Date: 4  Plan of Care/Certification Expiration Date: 24  No Show: 0  Progress Note Due Date: 24  Canceled Appointment: 0  Progress Note Counter:     Subjective Information:  Subjective: Things are going okay, I had a good weekend.  HEP Compliance:  [x] Good [] Fair [] Poor [] Reports not doing due to:         Treatment:  Exercises:  Exercises  Exercise 1: calf raises:  x 15  Exercise 2: BW squats:  2 x 10  Exercise 3: HS stretch 3 x 20\"  Exercise 4: apollo leg press: Foot position low (quad) 30# 1 x 10 / Foot position High (glute) 30# 2 x 10  Exercise 5: banded side stepping 50' x 2 laps hallway back to wall for support as needed  Exercise 6: back extension iso walkout Apollo 40# x 10 Retro  Exercise 8: StarTrac Bike Seat 5 lvl 4 for warm up prior to exercises. x 5 minutes  Exercise 20: HEP:  Cont. with current        *Indicates exercise, modality, or manual techniques to be initiated when appropriate    Objective Measures:      LTG 3 Current Status:: 24: Continued Faulty with posture and reported increased difficulty with LEs this date mid way through tx.        Assessment:   Body Structures, Functions, Activity Limitations Requiring Skilled Therapeutic Intervention: Decreased functional mobility , Decreased strength, Decreased posture  Assessment: Continued with

## 2024-07-05 ENCOUNTER — HOSPITAL ENCOUNTER (OUTPATIENT)
Dept: PHYSICAL THERAPY | Age: 79
Setting detail: THERAPIES SERIES
Discharge: HOME | End: 2024-07-05
Attending: STUDENT IN AN ORGANIZED HEALTH CARE EDUCATION/TRAINING PROGRAM
Payer: COMMERCIAL

## 2024-07-05 PROCEDURE — 97116 GAIT TRAINING THERAPY: CPT

## 2024-07-05 PROCEDURE — 97110 THERAPEUTIC EXERCISES: CPT

## 2024-07-05 NOTE — PROGRESS NOTES
[facility]  [address] [place] [place] [address]  Phone: [phone]      Date: 2024  Patient: Tomi Hinkle  : 1945   Confirmed: Yes  MRN: [id number]  Referring Provider: Edwin Meléndez DO    Medical Diagnosis: Spinal stenosis of lumbar region with neurogenic claudication [M48.062]       Treatment Diagnosis: low back weakness, LE weakness, decreased ambulatory tolerance    Visit Information:  Insurance: Payor: [employer]AN / Plan: DEV[employer] PLANS / Product Type: *No Product type* /   PT Visit Information  PT Insurance Information: Devoted  Total # of Visits Approved: 99  Total # of Visits to Date: 5  Plan of Care/Certification Expiration Date: 24  No Show: 0  Progress Note Due Date: 24  Canceled Appointment: 0  Progress Note Counter:     Subjective Information:  Subjective: I was pretty sore following last visit it felt like leg day.,  HEP Compliance:  [] Good [] Fair [] Poor [] Reports not doing due to:         Treatment:  Exercises:  Exercises  Exercise 1: calf raises:  x 15  Exercise 3: HS stretch 3 x 20\" Knee flexion/ hip flexor stretch 20\" x 3  Exercise 5: banded side stepping/ monster walks  50'  lap hallway  wall for support  Exercise 6: back extension iso walkout Apollo 40# x 10 Retro  Exercise 8: StarTrac Bike Seat 5 lvl 4 for warm up prior to exercises. x 5 minutes  Exercise 10: Marches x 50' x 2 laps  Exercise 20: HEP:  Cont. with current        *Indicates exercise, modality, or manual techniques to be initiated when appropriate    Objective Measures:        Assessment:   Body Structures, Functions, Activity Limitations Requiring Skilled Therapeutic Intervention: Decreased functional mobility , Decreased strength, Decreased posture  Assessment: Modified tx this date for improved tolerance and recovery time. Tomi was informed to inform us at [place] on how soreness and fatigue are following today tx with verbalized understanding. Tomi continues to

## 2024-07-09 ENCOUNTER — HOSPITAL ENCOUNTER (OUTPATIENT)
Dept: PHYSICAL THERAPY | Age: 79
Setting detail: THERAPIES SERIES
Discharge: HOME | End: 2024-07-09
Attending: STUDENT IN AN ORGANIZED HEALTH CARE EDUCATION/TRAINING PROGRAM
Payer: COMMERCIAL

## 2024-07-09 PROCEDURE — 97110 THERAPEUTIC EXERCISES: CPT

## 2024-07-09 NOTE — PROGRESS NOTES
compliance with HEP.      Assessment:   Body Structures, Functions, Activity Limitations Requiring Skilled Therapeutic Intervention: Decreased functional mobility , Decreased strength, Decreased posture  Assessment: Continued to slowly progress pt to reduce soreness and have better tolerance to exercises.  VC's and demonstrations provided during resisted monster walks and lateral stepping to avoid compensations and correct posture.  Pt demonstrated eversion of the leading foot during lateral stepping and had a downward gaze/ FF posture.  Pt reported no pain post tx with mild soreness. Will continue to progress pt as able to increase core and LE strength/ stability.  Treatment Diagnosis: low back weakness, LE weakness, decreased ambulatory tolerance  Therapy Prognosis: Good       Patient Education: [x] NA       Post-Pain Assessment:       Pain Rating (0-10 pain scale):   0/10   Location and pain description same as pre-treatment unless indicated.   Action: [] NA   [x] Perform HEP  [] Meds as prescribed  [] Modalities as prescribed   [] Call Physician     GOALS   Patient Goal(s): Patient Goals : increase strength in legs and back    Long Term Goals Completed by 4 weeks Goal Status   LTG 1 Pt will be independent with recommended HEP for LE strengthening. In progress   LTG 2 Pt will demo at least 1/2 MMT strength improvement in hips, low back, and L ankle to assist in ease of functional mobility. In progress   LTG 3 Pt will demo improved standing posture with verbalized increase in walking/standing duration. In progress            Plan:  Frequency/Duration:  Plan  Plan Frequency: 2  Plan weeks: 4  Current Treatment Recommendations: Strengthening, ROM, Manual, Neuromuscular re-education, Home exercise program, Patient/Caregiver education & training, Therapeutic activities  Pt to continue current HEP.  See objective section for any therapeutic exercise changes, additions or modifications this date.    Therapy Time:

## 2024-07-11 ENCOUNTER — HOSPITAL ENCOUNTER (OUTPATIENT)
Dept: PHYSICAL THERAPY | Age: 79
Setting detail: THERAPIES SERIES
Discharge: HOME | End: 2024-07-11
Attending: STUDENT IN AN ORGANIZED HEALTH CARE EDUCATION/TRAINING PROGRAM
Payer: COMMERCIAL

## 2024-07-11 PROCEDURE — 97110 THERAPEUTIC EXERCISES: CPT

## 2024-07-11 NOTE — PROGRESS NOTES
[facility]  [address] [place] [address]  Phone: [phone]    [] Certification  [] Recertification []  Plan of Care  [x] Progress Note [] Discharge      Referring Provider: Edwin Meléndez DO     From:  Enrique Jade, PT, DPT  Patient: Anibal Hinkle (78 y.o. male) : 1945 Date: 2024  Medical Diagnosis: Spinal stenosis of lumbar region with neurogenic claudication [M48.062]       Treatment Diagnosis: low back weakness, LE weakness, decreased ambulatory tolerance     Plan of Care/Certification Expiration Date: 24   Progress Report Period from:  2024  to 2024    Visits to Date: 7 No Show: 0 Cancelled Appts: 0    OBJECTIVE:   Long Term Goals - Time Frame for Long Term Goals : 4 weeks  Goals Current/ Discharge status Status   Long Term Goal 1: Pt will be independent with recommended HEP for LE strengthening. LTG 1 Current Status:: 24: Pt reports independence with HEP and recreational exercise   Met   Long Term Goal 2: Pt will demo at least 1/2 MMT strength improvement in hips, low back, and L ankle to assist in ease of functional mobility. LTG 2 Current Status:: see strength measures  Strength LLE  Comment: 5/5 throughout except L ankle PF 4/5  Strength RLE  Comment: 5/5 throughout       Met   Long Term Goal 3: Pt will demo improved standing posture with verbalized increase in walking/standing duration. LTG 3 Current Status:: 24: pt reports improved postural awareness and walking tolerance   Met     Body Structures, Functions, Activity Limitations Requiring Skilled Therapeutic Intervention: Decreased functional mobility , Decreased strength, Decreased posture  Assessment: Pt meeting all therapy goals at this time. Additional stretches reviewed today to help patient with spine and hip mobility. Pt reports good understanding of HEP and ability to independently exercise at the local [facility] center. Pt to trial independent mgmt of his HEP at this time while therapy 
Therapeutic Intervention: Decreased functional mobility , Decreased strength, Decreased posture  Assessment: Pt meeting all therapy goals at this time. Additional stretches reviewed today to help patient with spine and hip mobility. Pt reports good understanding of HEP and ability to independently exercise at the local [facility] center. Pt to trial independent mgmt of his HEP at this time while therapy is on hold. Therapy POC to officially discharge if no further services are required over the next 30 days.  Treatment Diagnosis: low back weakness, LE weakness, decreased ambulatory tolerance  Therapy Prognosis: Good         Post-Pain Assessment:       Pain Rating (0-10 pain scale):   0/10   Location and pain description same as pre-treatment unless indicated.   Action: [x] NA   [] Perform HEP  [] Meds as prescribed  [] Modalities as prescribed   [] Call Physician     GOALS   Patient Goal(s): Patient Goals : increase strength in legs and back    Long Term Goals Completed by 4 weeks Goal Status   LTG 1 Pt will be independent with recommended HEP for LE strengthening. Met   LTG 2 Pt will demo at least 1/2 MMT strength improvement in hips, low back, and L ankle to assist in ease of functional mobility. Met   LTG 3 Pt will demo improved standing posture with verbalized increase in walking/standing duration. Met       Plan:  Frequency/Duration:  Plan  Plan Frequency: 2  Plan weeks: 4  Current Treatment Recommendations: Strengthening, ROM, Manual, Neuromuscular re-education, Home exercise program, Patient/Caregiver education & training, Therapeutic activities  Additional Comments: PT hold for pt trial independent HEP; POC to be discontinued if no services required over next 30 days  Pt to continue current HEP.  See objective section for any therapeutic exercise changes, additions or modifications this date.    Therapy Time:      PT Individual Minutes  Time In: 0920  Time Out: 1000  Minutes: 40  Timed Code Treatment Minutes: 40

## 2024-07-23 ENCOUNTER — OFFICE VISIT (OUTPATIENT)
Age: 79
End: 2024-07-23
Payer: COMMERCIAL

## 2024-07-23 VITALS
HEIGHT: 72 IN | SYSTOLIC BLOOD PRESSURE: 118 MMHG | WEIGHT: 198 LBS | BODY MASS INDEX: 26.82 KG/M2 | TEMPERATURE: 96.7 F | DIASTOLIC BLOOD PRESSURE: 78 MMHG

## 2024-07-23 DIAGNOSIS — M48.062 SPINAL STENOSIS OF LUMBAR REGION WITH NEUROGENIC CLAUDICATION: Primary | ICD-10-CM

## 2024-07-23 DIAGNOSIS — M96.1 FAILED BACK SYNDROME: ICD-10-CM

## 2024-07-23 PROCEDURE — 99214 OFFICE O/P EST MOD 30 MIN: CPT | Performed by: STUDENT IN AN ORGANIZED HEALTH CARE EDUCATION/TRAINING PROGRAM

## 2024-07-23 PROCEDURE — 1123F ACP DISCUSS/DSCN MKR DOCD: CPT | Performed by: STUDENT IN AN ORGANIZED HEALTH CARE EDUCATION/TRAINING PROGRAM

## 2024-07-23 PROCEDURE — 3074F SYST BP LT 130 MM HG: CPT | Performed by: STUDENT IN AN ORGANIZED HEALTH CARE EDUCATION/TRAINING PROGRAM

## 2024-07-23 PROCEDURE — 3078F DIAST BP <80 MM HG: CPT | Performed by: STUDENT IN AN ORGANIZED HEALTH CARE EDUCATION/TRAINING PROGRAM

## 2024-07-23 RX ORDER — GABAPENTIN 300 MG/1
300 CAPSULE ORAL 3 TIMES DAILY
Qty: 90 CAPSULE | Refills: 1 | Status: SHIPPED | OUTPATIENT
Start: 2024-07-23 | End: 2024-09-21

## 2024-07-23 ASSESSMENT — ENCOUNTER SYMPTOMS: BACK PAIN: 1

## 2024-07-23 NOTE — PROGRESS NOTES
[facility] [place] [facility], [facility] PAIN MANAGEMENT  [address] [address]  Dept: [phone]  Dept Fax: [phone]  Loc: [phone]     7/23/2024    Visit type: Established     Reason for Visit: No chief complaint on file.       ASSESSMENT/PLAN   1. Spinal stenosis of lumbar region with neurogenic claudication  Overview:  Chronic illness with a severe exacerbation and/or progression which affects ADL's. Fatigue/weakness/pain has been present for multiple years and is expected to last at least a year. Patient is unable to transfer nor ambulate. Goals of care include pain relief >50% and ability to perform ADLs with less pain.     78-year-old male with history of lumbar laminectomy and fusion back in 2013 who presents with low back fatigue/weakness for multiple years.  The sensation is extremely debilitating and affects ADLs.  The symptoms limit mobility and functionality.  Of note, patient does not complain of severe pain.  The symptoms he is most concerned with are fatigue/weakness which likely resemble spinal stenosis and claudication.     On physical examination, patient displays lumbar tenderness.  Straight leg raise is negative bilaterally.  He does display positive shopping cart sign.    MRI of lumbar spine shows multilevel degenerative changes.   Assessment & Plan:     -Continue exercises  -Begin Gabapentin 300mg TID  -Continue Baclofen 5mg TID prn  Orders:  -     gabapentin (NEURONTIN) 300 MG capsule; Take 1 capsule by mouth 3 times daily for 60 days., Disp-90 capsule, R-1Normal  2. Failed back syndrome  Overview:  Chronic illness with a severe exacerbation and/or progression which affects ADL's. Fatigue/weakness/pain has been present for multiple years and is expected to last at least a year. Patient is unable to transfer nor ambulate. Goals of care include pain relief >50% and ability to perform ADLs with less pain.     78-year-old male with

## 2024-07-23 NOTE — PROGRESS NOTES
HPI  Location:Low back pain   Patient states that his pain is at a  0  at rest and a  0  with activity on the pain scale.

## 2024-08-20 DIAGNOSIS — M48.062 SPINAL STENOSIS OF LUMBAR REGION WITH NEUROGENIC CLAUDICATION: ICD-10-CM

## 2024-08-20 RX ORDER — BACLOFEN 5 MG/1
TABLET ORAL
Qty: 60 TABLET | Refills: 1 | OUTPATIENT
Start: 2024-08-20

## 2024-10-10 DIAGNOSIS — M48.062 SPINAL STENOSIS OF LUMBAR REGION WITH NEUROGENIC CLAUDICATION: ICD-10-CM

## 2024-10-10 DIAGNOSIS — M96.1 FAILED BACK SYNDROME: ICD-10-CM

## 2024-10-11 RX ORDER — GABAPENTIN 300 MG/1
300 CAPSULE ORAL 3 TIMES DAILY
Qty: 90 CAPSULE | Refills: 1 | OUTPATIENT
Start: 2024-10-11

## 2024-10-19 DIAGNOSIS — E78.2 MIXED HYPERLIPIDEMIA: ICD-10-CM

## 2024-10-21 DIAGNOSIS — E78.2 MIXED HYPERLIPIDEMIA: ICD-10-CM

## 2024-10-21 RX ORDER — PRAVASTATIN SODIUM 10 MG/1
TABLET ORAL
Qty: 30 TABLET | Refills: 0 | Status: SHIPPED | OUTPATIENT
Start: 2024-10-21 | End: 2024-10-24 | Stop reason: SDUPTHER

## 2024-10-21 RX ORDER — PRAVASTATIN SODIUM 10 MG/1
TABLET ORAL
Qty: 90 TABLET | OUTPATIENT
Start: 2024-10-21

## 2024-10-24 ENCOUNTER — APPOINTMENT (OUTPATIENT)
Dept: CARDIOLOGY | Facility: CLINIC | Age: 79
End: 2024-10-24
Payer: MEDICARE

## 2024-10-24 VITALS
HEART RATE: 72 BPM | HEIGHT: 72 IN | DIASTOLIC BLOOD PRESSURE: 72 MMHG | BODY MASS INDEX: 27.58 KG/M2 | WEIGHT: 203.6 LBS | SYSTOLIC BLOOD PRESSURE: 132 MMHG

## 2024-10-24 DIAGNOSIS — Z45.09 ENCOUNTER FOR LOOP RECORDER AT END OF BATTERY LIFE: ICD-10-CM

## 2024-10-24 DIAGNOSIS — Z87.891 FORMER SMOKER: ICD-10-CM

## 2024-10-24 DIAGNOSIS — E78.2 MIXED HYPERLIPIDEMIA: ICD-10-CM

## 2024-10-24 DIAGNOSIS — I42.8 NON-ISCHEMIC CARDIOMYOPATHY (MULTI): ICD-10-CM

## 2024-10-24 DIAGNOSIS — Z87.898 HISTORY OF PALPITATIONS: ICD-10-CM

## 2024-10-24 DIAGNOSIS — R94.31 ABNORMAL EKG: ICD-10-CM

## 2024-10-24 DIAGNOSIS — I10 ESSENTIAL HYPERTENSION: ICD-10-CM

## 2024-10-24 PROCEDURE — 1036F TOBACCO NON-USER: CPT | Performed by: INTERNAL MEDICINE

## 2024-10-24 PROCEDURE — 3078F DIAST BP <80 MM HG: CPT | Performed by: INTERNAL MEDICINE

## 2024-10-24 PROCEDURE — 1159F MED LIST DOCD IN RCRD: CPT | Performed by: INTERNAL MEDICINE

## 2024-10-24 PROCEDURE — 3075F SYST BP GE 130 - 139MM HG: CPT | Performed by: INTERNAL MEDICINE

## 2024-10-24 PROCEDURE — 99214 OFFICE O/P EST MOD 30 MIN: CPT | Performed by: INTERNAL MEDICINE

## 2024-10-24 RX ORDER — SACUBITRIL AND VALSARTAN 97; 103 MG/1; MG/1
1 TABLET, FILM COATED ORAL 2 TIMES DAILY
Qty: 180 TABLET | Refills: 3 | Status: SHIPPED | OUTPATIENT
Start: 2024-10-24 | End: 2025-10-24

## 2024-10-24 RX ORDER — PRAVASTATIN SODIUM 10 MG/1
10 TABLET ORAL NIGHTLY
Qty: 90 TABLET | Refills: 3 | Status: SHIPPED | OUTPATIENT
Start: 2024-10-24

## 2024-10-24 RX ORDER — CARVEDILOL 3.12 MG/1
3.12 TABLET ORAL 2 TIMES DAILY
Qty: 180 TABLET | Refills: 3 | Status: SHIPPED | OUTPATIENT
Start: 2024-10-24

## 2024-10-24 NOTE — PATIENT INSTRUCTIONS
DID YOU KNOW  We have a pharmacy here in the [facility].  They can fill all prescriptions, not just cardiac medications.  Prescriptions from other pharmacies can easily be transferred to the  pharmacy by the  pharmacist on site.   pharmacies offer FREE HOME DELIVERY on medications to anywhere in [place]. They can sync your medications. Typically prescriptions can be ready in 10 - 15 minutes. If pharmacy is unable to fill your  prescription or if cost is more than your paying now the Pharmacist can easily transfer back to your Pharmacy of choice. Pharmacy phone # [phone].         Please bring all medicines, vitamins, and herbal supplements with you in original bottles to every appointment!!!!    Prescriptions will not be filled unless you are compliant with your follow up appointments or have a follow up appointment scheduled as per instruction of your physician. Refills should be requested at the time of your visit.

## 2024-10-24 NOTE — PROGRESS NOTES
Patient:  Horace Klein  YOB: 1945  MRN: [id number]       HPI:       Horace Klein is a 79 y.o. male who returns today for cardiac follow-up.  He was being followed on a regular basis by Dr. Moi Sanchez prior to his nursing home.  He has a history of nonischemic cardiomyopathy with previous LV ejection fraction as low as 35% on echocardiogram November 2020.  Most recent echocardiogram November 9, 2023 showed an improved LV ejection fraction of 45 to 50%.  There was trivial mitral and tricuspid regurgitation.  He has been maintained on Entresto and carvedilol.  He previously had a loop recorder although this was removed by Dr. Montenegro.  He has a history of hypertension, hyperlipidemia, and prior tobacco abuse.    He has been doing very well since his last visit.  He remains active without limitations.  He golfed extensively this summer.  He denies any chest pain or shortness of breath.  He denies any orthopnea, PND, or increasing peripheral edema.  He denies any palpitations, lightheadedness, near-syncope, or syncope.  He denies any fever, chills, or cough.  He denies any nausea, vomiting, or diaphoresis.  He denies any hemoptysis, hematemesis, melena, or hematochezia.  Lab studies May 14, 2024 showed a normal CBC and CMP with exception of glucose 113.  Cholesterol 156 with triglycerides 150, HDL 49, and LDL 77.  He is currently free of any anginal or CHF symptoms.  His blood pressures are well-controlled.  He will continue on his same medication.  Other details noted below.    The above portion of this note was dictated by me using voice recognition software.  I personally performed the services described in the documentation.  The scribe entering the documentation below was in my presence.  I affirm that the information is both accurate and complete.      Objective:     Vitals:    10/24/24 0925   BP: 132/72   Pulse: 72       Wt Readings from Last 4 Encounters:   10/24/24 92.4 kg (203 lb 9.6 oz)    12/01/23 89.8 kg (198 lb)   11/15/23 89.4 kg (197 lb 1.5 oz)   11/09/23 89.8 kg (198 lb)       Allergies:     No Known Allergies       Medications:     Current Outpatient Medications   Medication Instructions    aspirin 81 mg, oral, Daily, HOLD x 1 week.  Resume on 11/22/2023.    carvedilol (Coreg) 3.125 mg tablet TAKE 1 TABLET(3.125 MG) BY MOUTH TWICE DAILY WITH MEALS    cholecalciferol (Vitamin D-3) 50 MCG (2000 UT) tablet 1 tablet, Daily    magnesium oxide (Mag-Ox) 400 mg (241.3 mg magnesium) tablet 0.5 tablets, Daily    multivit-min/ferrous fumarate (MULTI VITAMIN ORAL) 1 tablet, Daily    pravastatin (Pravachol) 10 mg tablet TAKE 1 TABLET(10 MG) BY MOUTH DAILY AT BEDTIME    sacubitriL-valsartan (Entresto)  mg tablet 1 tablet, oral, 2 times daily       Physical Examination:   GENERAL:  Well developed, well nourished, in no acute distress.  CHEST:  Symmetric and nontender.  NEURO/PSYCH:  Alert and oriented times three with approppriate behavior and responses.  NECK:  Supple, no JVD, no bruit.  LUNGS:  Clear to auscultation bilaterally, normal respiratory effort.  HEART:  Rate and rhythm regular with no evident murmur, no gallop appreciated.        There are no rubs, clicks or heaves.  EXTREMITIES:  Warm with good color, no clubbing or cyanosis.  There is no edema noted.  PERIPHERAL VASCULAR:  Pulses present and equally palpable; 2+ throughout.      Lab:     CBC:   Lab Results   Component Value Date    WBC 9.2 11/15/2023    RBC 4.48 (L) 11/15/2023    HGB 15.4 11/15/2023    HCT 44.9 11/15/2023     11/15/2023        CMP:    Lab Results   Component Value Date     05/17/2021    K 4.0 05/17/2021     05/17/2021    CO2 26 05/17/2021    BUN 17 05/17/2021    CREATININE 0.76 05/17/2021    GLUCOSE 98 05/17/2021    CALCIUM 8.9 05/17/2021       BMP:  Lab Results   Component Value Date     05/17/2021    K 4.0 05/17/2021     05/17/2021    CO2 26 05/17/2021    BUN 17 05/17/2021    CREATININE  0.76 05/17/2021       CBC:  Lab Results   Component Value Date    WBC 9.2 11/15/2023    WBC 5.9 05/17/2021    RBC 4.48 (L) 11/15/2023    RBC 4.38 (L) 05/17/2021    HGB 15.4 11/15/2023    HGB 14.8 05/17/2021    HCT 44.9 11/15/2023    HCT 44.1 05/17/2021     11/15/2023     (H) 05/17/2021    MCH 34.4 (H) 11/15/2023    MCHC 34.3 11/15/2023    MCHC 33.6 05/17/2021    RDW 12.2 11/15/2023    RDW 12.6 05/17/2021     11/15/2023     05/17/2021       Hepatic Function Panel:    Lab Results   Component Value Date    ALKPHOS 65 05/17/2021    ALT 20 05/17/2021    AST 27 05/17/2021    PROT 6.6 05/17/2021    BILITOT 1.1 05/17/2021       HgBA1c:    Lab Results   Component Value Date    HGBA1C 5.4 11/13/2023       PT/INR:    Lab Results   Component Value Date    PROTIME 12.8 11/15/2023    INR 1.1 11/15/2023         Diagnostic Studies:     ECG 12 lead STAT  Result Date: 11/15/2023    Sinus rhythm with occasional Premature ventricular complexes Left axis deviation Left bundle branch block Abnormal ECG When compared with ECG of 19-MAY-2021 08:26, No significant change was found Confirmed by Moi Sanchez ([id number]) on 11/15/2023 3:22:10 PM    Electrophysiology procedure  Result Date: 11/15/2023  .Cardiac Event Recorder Explantation Summary: Successful explantation of a cardiac event recorder. Recommendations: The patient should continue with the present medications. Discharge: The patient left the EP laboratory in stable condition. Follow up: The patient will be discharged on the day of the procedure, following bed rest and subsequent ambulation, provided the recovery parameters are appropriate. The patient should be alert for bleeding, swelling, or signs of infection. The patient should call the electrophysiologist immediately if symptoms recur, or for any problems. The patient and family (wife via telephone with HIPAA consent) have been instructed accordingly. Procedures: Explant of implantable event recorder.   Device analysis and reprogramming. Fluoroscopy. Patient history: Please refer to the detailed history and physical on the patient's medical chart. Diagnosis Device at end of service Procedure narrative: The risks, benefits, and alternatives to the procedure and sedation were explained to the patient, and informed consent was obtained. The patient was in the fasting state. A grounding pad was placed. Self-adhesive anterior-posterior defibrillation pads were applied. A defibrillator was used for monitoring and the defibrillator waveform was set to biphasic. The patient was set up for continuous monitoring of surface 12 lead ECG and pulse oximetry. Blood pressure was monitored. The procedure was performed under IV conscious sedation. The upper chest was prepped and draped in the usual sterile fashion. Local anesthesia: After preoperative IV antibiotic was infused, subcutaneous tissues were infused with Lidocaine 1% for local anesthesia. Fluoroscopy identified the superior margin of the device. The device was analyzed and reprogrammed. An incision was made above the header of device. An implantable cardiac event recorder ([id number] #[id number]) was explanted. The pocket was flushed with vancomycin solution. The skin was approximated with skin adhesive and steri strips. A sterile dressing was applied. The patient was transferred to the telemetry unit. Complications: The patient tolerated the procedure without any complications or incident.        Problem List:     Patient Active Problem List   Diagnosis    Abnormal EKG    Abnormal MRI    Essential hypertension    History of knee replacement    Status post placement of implantable loop recorder    Hyperlipidemia    Inability to maintain erection    Left bundle branch block (LBBB)    Non-ischemic cardiomyopathy (Multi)    PVC (premature ventricular contraction)    Sinus tachycardia    History of back surgery    Former smoker    History of hernia repair    BMI  26.0-26.9,adult    Encounter for loop recorder at end of battery life    Encounter to discuss test results    Encounter for medication review and counseling    History of palpitations       Asessment:     Problem List Items Addressed This Visit             ICD-10-CM    Abnormal EKG R94.31    Relevant Orders    Follow Up In Cardiology    Essential hypertension I10    Relevant Medications    carvedilol (Coreg) 3.125 mg tablet    sacubitriL-valsartan 97 mg-103 mg (Entresto)  mg tablet    Other Relevant Orders    Follow Up In Cardiology    Hyperlipidemia E78.5    Relevant Medications    pravastatin (Pravachol) 10 mg tablet    Other Relevant Orders    Follow Up In Cardiology    Non-ischemic cardiomyopathy (Multi) I42.8    Relevant Medications    carvedilol (Coreg) 3.125 mg tablet    sacubitriL-valsartan 97 mg-103 mg (Entresto)  mg tablet    Other Relevant Orders    Follow Up In Cardiology    Former smoker Z87.891    Relevant Orders    Follow Up In Cardiology    BMI 26.0-26.9,adult Z68.26    Relevant Orders    Follow Up In Cardiology    Encounter for loop recorder at end of battery life Z45.09    Relevant Orders    Follow Up In Cardiology    History of palpitations Z87.898    Relevant Medications    carvedilol (Coreg) 3.125 mg tablet    sacubitriL-valsartan 97 mg-103 mg (Entresto)  mg tablet    Other Relevant Orders    Follow Up In Cardiology

## 2024-11-12 ENCOUNTER — OFFICE VISIT (OUTPATIENT)
Age: 79
End: 2024-11-12
Payer: COMMERCIAL

## 2024-11-12 VITALS
DIASTOLIC BLOOD PRESSURE: 74 MMHG | HEIGHT: 72 IN | BODY MASS INDEX: 26.82 KG/M2 | WEIGHT: 198 LBS | TEMPERATURE: 96.8 F | SYSTOLIC BLOOD PRESSURE: 118 MMHG

## 2024-11-12 DIAGNOSIS — M48.062 SPINAL STENOSIS OF LUMBAR REGION WITH NEUROGENIC CLAUDICATION: Primary | ICD-10-CM

## 2024-11-12 DIAGNOSIS — M96.1 FAILED BACK SYNDROME: ICD-10-CM

## 2024-11-12 PROCEDURE — 3078F DIAST BP <80 MM HG: CPT | Performed by: STUDENT IN AN ORGANIZED HEALTH CARE EDUCATION/TRAINING PROGRAM

## 2024-11-12 PROCEDURE — 1123F ACP DISCUSS/DSCN MKR DOCD: CPT | Performed by: STUDENT IN AN ORGANIZED HEALTH CARE EDUCATION/TRAINING PROGRAM

## 2024-11-12 PROCEDURE — 99214 OFFICE O/P EST MOD 30 MIN: CPT | Performed by: STUDENT IN AN ORGANIZED HEALTH CARE EDUCATION/TRAINING PROGRAM

## 2024-11-12 PROCEDURE — 3074F SYST BP LT 130 MM HG: CPT | Performed by: STUDENT IN AN ORGANIZED HEALTH CARE EDUCATION/TRAINING PROGRAM

## 2024-11-12 PROCEDURE — 1126F AMNT PAIN NOTED NONE PRSNT: CPT | Performed by: STUDENT IN AN ORGANIZED HEALTH CARE EDUCATION/TRAINING PROGRAM

## 2024-11-12 PROCEDURE — 1159F MED LIST DOCD IN RCRD: CPT | Performed by: STUDENT IN AN ORGANIZED HEALTH CARE EDUCATION/TRAINING PROGRAM

## 2024-11-12 RX ORDER — BACLOFEN 5 MG/1
5 TABLET ORAL 2 TIMES DAILY PRN
Qty: 60 TABLET | Refills: 3 | Status: SHIPPED | OUTPATIENT
Start: 2024-11-12

## 2024-11-12 RX ORDER — GABAPENTIN 300 MG/1
300 CAPSULE ORAL 3 TIMES DAILY
Qty: 90 CAPSULE | Refills: 3 | Status: SHIPPED | OUTPATIENT
Start: 2024-11-12 | End: 2025-03-12

## 2024-11-12 ASSESSMENT — ENCOUNTER SYMPTOMS: BACK PAIN: 1

## 2024-11-12 NOTE — PROGRESS NOTES
HPI  Location: Back  Patient states that his pain is at a  0  at rest and a  0  with activity on the pain scale.   Patient is currently prescribed Baclofen (LIORESAL) 5 MG tablet . Patient states he receives no relief from medication.  Patient is currently prescribed gabapentin (NEURONTIN) 300 MG capsule. Patient states he receives no relief from medication.    
seen on 7/23/2024 for follow up.    Location: Back  Patient states that his pain is at a  0  at rest and a  0  with activity on the pain scale. He does however suffer from severe cramping and tiredness in the legs. These symptoms are rated on a severity scale of 7 out of 10.   Patient is currently prescribed Baclofen (LIORESAL) 5 MG tablet . Patient states he receives no relief from medication.  Patient is currently prescribed gabapentin (NEURONTIN) 300 MG capsule. Patient states he receives no relief from medication.    Anibal is here for counseling on his symptoms and management. I advised his to get back into working with PT regularly as this has been helpful as well as taking his medications as prescribed (refills provided). We discussed a caudal ALEJANDRO for symptom control. I will follow up with him in one month to discuss continuing our course with PT/medications versus trailing an ALEJANDRO.     Review of Systems   Musculoskeletal:  Positive for back pain.   All other systems reviewed and are negative.     Physical Exam  Vitals and nursing note reviewed.   Constitutional:       Appearance: Normal appearance.   Cardiovascular:      Pulses: Normal pulses.   Pulmonary:      Effort: Pulmonary effort is normal.   Abdominal:      General: There is no distension.   Musculoskeletal:      Cervical back: Normal.      Thoracic back: Normal.      Lumbar back: Tenderness present. Negative right straight leg raise test and negative left straight leg raise test.        Legs:    Neurological:      General: No focal deficit present.      Mental Status: He is alert and oriented to person, place, and time.      Cranial Nerves: Cranial nerves 2-12 are intact.      Sensory: Sensation is intact.      Motor: Motor function is intact.      Coordination: Coordination is intact.      Gait: Gait is intact.   Psychiatric:         Mood and Affect: Mood normal.         Behavior: Behavior normal.         Thought Content: Thought content normal.

## 2024-12-05 ENCOUNTER — APPOINTMENT (OUTPATIENT)
Dept: CARDIOLOGY | Facility: CLINIC | Age: 79
End: 2024-12-05
Payer: MEDICARE

## 2025-10-09 ENCOUNTER — APPOINTMENT (OUTPATIENT)
Dept: CARDIOLOGY | Facility: CLINIC | Age: 80
End: 2025-10-09
Payer: COMMERCIAL

## 2025-11-10 ENCOUNTER — APPOINTMENT (OUTPATIENT)
Dept: CARDIOLOGY | Facility: CLINIC | Age: 80
End: 2025-11-10
Payer: COMMERCIAL

## (undated) DEVICE — PACK,LAPAROTOMY,NO GOWNS: Brand: MEDLINE

## (undated) DEVICE — TUBING, SUCTION, 1/4" X 10', STRAIGHT: Brand: MEDLINE

## (undated) DEVICE — DRESSING, AQUACEL AG, HYDROFIBER W/SILVER, 3.5 X 3.75 IN

## (undated) DEVICE — GOWN,AURORA,NONREINFORCED,LARGE: Brand: MEDLINE

## (undated) DEVICE — SUTURE MCRYL SZ 4-0 L27IN ABSRB UD L19MM PS-2 1/2 CIR PRIM Y426H

## (undated) DEVICE — CHLORAPREP 26ML ORANGE

## (undated) DEVICE — ELECTROSURGICAL PENCIL BUTTON SWITCH E-Z CLEAN COATED BLADE ELECTRODE 10 FT (3 M) CORD HOLSTER: Brand: MEGADYNE

## (undated) DEVICE — SUTURE VCRL SZ 0 L36IN ABSRB UD L36MM CT-1 1/2 CIR J946H

## (undated) DEVICE — SUTURE ABSORBABLE BRAIDED 0 CT-1 8X27 IN UD VICRYL JJ41G

## (undated) DEVICE — Device

## (undated) DEVICE — SINGLE PORT MANIFOLD: Brand: NEPTUNE 2

## (undated) DEVICE — SPONGE,LAP,18"X18",DLX,XR,ST,5/PK,40/PK: Brand: MEDLINE

## (undated) DEVICE — DRAPE EQUIP TRNSPRT CONTAINMENT FOR BK TAB

## (undated) DEVICE — SUTURE VCRL + SZ 2-0 L36IN ABSRB UD L36MM CT-1 1/2 CIR VCP945H

## (undated) DEVICE — TOWEL,OR,DSP,ST,BLUE,STD,4/PK,20PK/CS: Brand: MEDLINE

## (undated) DEVICE — GOWN,AURORA,NONRNF,XL,30/CS: Brand: MEDLINE

## (undated) DEVICE — GLOVE ORANGE PI 7 1/2   MSG9075

## (undated) DEVICE — E-Z CLEAN, NON-STICK, PTFE COATED, ELECTROSURGICAL BLADE ELECTRODE, 6.5 INCH (16.5 CM): Brand: MEGADYNE

## (undated) DEVICE — LABEL MED MINI W/ MARKER

## (undated) DEVICE — MARKER SURG SKIN GENTIAN VLT REG TIP W/ 6IN RUL

## (undated) DEVICE — Z DISCONTINUED NO SUB IDED GLOVE SURG BEAD CUF 8 STD PF WHT STRL TRIUMPH LT LTX

## (undated) DEVICE — INTENDED FOR TISSUE SEPARATION, AND OTHER PROCEDURES THAT REQUIRE A SHARP SURGICAL BLADE TO PUNCTURE OR CUT.: Brand: BARD-PARKER ® CARBON RIB-BACK BLADES

## (undated) DEVICE — NEPTUNE E-SEP SMOKE EVACUATION PENCIL, COATED, 70MM BLADE, PUSH BUTTON SWITCH: Brand: NEPTUNE E-SEP

## (undated) DEVICE — COUNTER NDL 40 COUNT HLD 70 FOAM BLK ADH W/ MAG

## (undated) DEVICE — DRAPE,MEDI-SLUSH,STERILE: Brand: MEDLINE

## (undated) DEVICE — YANKAUER,POOLE TIP,STERILE,50/CS: Brand: MEDLINE

## (undated) DEVICE — APPLICATOR MEDICATED 26 CC SOLUTION HI LT ORNG CHLORAPREP

## (undated) DEVICE — GAUZE,SPONGE,4"X4",16PLY,XRAY,STRL,LF: Brand: MEDLINE

## (undated) DEVICE — 3M™ IOBAN™ 2 ANTIMICROBIAL INCISE DRAPE 6650EZ: Brand: IOBAN™ 2

## (undated) DEVICE — TROCAR: Brand: KII FIOS FIRST ENTRY

## (undated) DEVICE — BLADE ES ELASTOMERIC COAT INSUL DURABLE BEND UPTO 90DEG

## (undated) DEVICE — YANKAUER,BULB TIP,W/O VENT,RIGID,STERILE: Brand: MEDLINE

## (undated) DEVICE — PATIENT RETURN ELECTRODE, SINGLE-USE, CONTACT QUALITY MONITORING, ADULT, WITH 9FT CORD, FOR PATIENTS WEIGING OVER 33LBS. (15KG): Brand: MEGADYNE

## (undated) DEVICE — Z DUPLICATE USE 2431315 SET INSUF TBNG HI FLO W/ SMK EVAC FOR PNEUMOCLEAR

## (undated) DEVICE — WARMER SCP LAP

## (undated) DEVICE — KIT,ANTI FOG,W/SPONGE & FLUID,SOFT PACK: Brand: MEDLINE

## (undated) DEVICE — GOWN,SIRUS,POLYRNF,BRTHSLV,XLN/XL,20/CS: Brand: MEDLINE

## (undated) DEVICE — BINDER ABD 2XL H12XL60 75IN UNISX STD E 4 PNL DISPOSABLE

## (undated) DEVICE — SUTURE PERMA-HAND SZ 3-0 L18IN NONABSORBABLE BLK SH-1 L22MM C003D

## (undated) DEVICE — ELECTRODE PT RET AD L9FT HI MOIST COND ADH HYDRGEL CORDED

## (undated) DEVICE — Z DISCONTINUED PER MEDLINE USE 2741944 DRESSING AQUACEL 12 IN SURG W9XL30CM SIL CVR WTRPRF VIR BACT BARR ANTIMIC

## (undated) DEVICE — COVER LT HNDL BLU PLAS

## (undated) DEVICE — SUTURE PROL SZ 0 L30IN NONABSORBABLE BLU L36MM CT-1 1/2 CIR 8424H